# Patient Record
Sex: FEMALE | Race: ASIAN | NOT HISPANIC OR LATINO | Employment: STUDENT | URBAN - METROPOLITAN AREA
[De-identification: names, ages, dates, MRNs, and addresses within clinical notes are randomized per-mention and may not be internally consistent; named-entity substitution may affect disease eponyms.]

---
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
## 2020-04-08 ENCOUNTER — APPOINTMENT (OUTPATIENT)
Dept: PEDIATRIC HEMATOLOGY/ONCOLOGY | Facility: OUTPATIENT CENTER | Age: 20
End: 2020-04-08
Payer: COMMERCIAL

## 2020-04-30 ENCOUNTER — HOSPITAL ENCOUNTER (OUTPATIENT)
Dept: LAB | Facility: MEDICAL CENTER | Age: 20
End: 2020-04-30
Attending: FAMILY MEDICINE
Payer: COMMERCIAL

## 2020-04-30 LAB
ALBUMIN SERPL BCP-MCNC: 4.5 G/DL (ref 3.2–4.9)
ALBUMIN/GLOB SERPL: 1.7 G/DL
ALP SERPL-CCNC: 56 U/L (ref 30–99)
ALT SERPL-CCNC: 10 U/L (ref 2–50)
ANION GAP SERPL CALC-SCNC: 13 MMOL/L (ref 7–16)
AST SERPL-CCNC: 14 U/L (ref 12–45)
BASOPHILS # BLD AUTO: 0.4 % (ref 0–1.8)
BASOPHILS # BLD: 0.02 K/UL (ref 0–0.12)
BILIRUB SERPL-MCNC: 0.6 MG/DL (ref 0.1–1.5)
BUN SERPL-MCNC: 12 MG/DL (ref 8–22)
CALCIUM SERPL-MCNC: 9.2 MG/DL (ref 8.5–10.5)
CHLORIDE SERPL-SCNC: 103 MMOL/L (ref 96–112)
CO2 SERPL-SCNC: 24 MMOL/L (ref 20–33)
CREAT SERPL-MCNC: 0.63 MG/DL (ref 0.5–1.4)
CRP SERPL HS-MCNC: 0.04 MG/DL (ref 0–0.75)
EOSINOPHIL # BLD AUTO: 0.15 K/UL (ref 0–0.51)
EOSINOPHIL NFR BLD: 2.7 % (ref 0–6.9)
ERYTHROCYTE [DISTWIDTH] IN BLOOD BY AUTOMATED COUNT: 40.8 FL (ref 35.9–50)
ERYTHROCYTE [SEDIMENTATION RATE] IN BLOOD BY WESTERGREN METHOD: 2 MM/HOUR (ref 0–20)
FASTING STATUS PATIENT QL REPORTED: NORMAL
GLOBULIN SER CALC-MCNC: 2.6 G/DL (ref 1.9–3.5)
GLUCOSE SERPL-MCNC: 87 MG/DL (ref 65–99)
HCT VFR BLD AUTO: 44.5 % (ref 37–47)
HGB BLD-MCNC: 14.9 G/DL (ref 12–16)
IMM GRANULOCYTES # BLD AUTO: 0.01 K/UL (ref 0–0.11)
IMM GRANULOCYTES NFR BLD AUTO: 0.2 % (ref 0–0.9)
LYMPHOCYTES # BLD AUTO: 1.46 K/UL (ref 1–4.8)
LYMPHOCYTES NFR BLD: 26.7 % (ref 22–41)
MCH RBC QN AUTO: 32.9 PG (ref 27–33)
MCHC RBC AUTO-ENTMCNC: 33.5 G/DL (ref 33.6–35)
MCV RBC AUTO: 98.2 FL (ref 81.4–97.8)
MONOCYTES # BLD AUTO: 0.29 K/UL (ref 0–0.85)
MONOCYTES NFR BLD AUTO: 5.3 % (ref 0–13.4)
NEUTROPHILS # BLD AUTO: 3.53 K/UL (ref 2–7.15)
NEUTROPHILS NFR BLD: 64.7 % (ref 44–72)
NRBC # BLD AUTO: 0 K/UL
NRBC BLD-RTO: 0 /100 WBC
PLATELET # BLD AUTO: 212 K/UL (ref 164–446)
PMV BLD AUTO: 9.4 FL (ref 9–12.9)
POTASSIUM SERPL-SCNC: 4.1 MMOL/L (ref 3.6–5.5)
PROT SERPL-MCNC: 7.1 G/DL (ref 6–8.2)
RBC # BLD AUTO: 4.53 M/UL (ref 4.2–5.4)
RHEUMATOID FACT SER IA-ACNC: <10 IU/ML (ref 0–14)
SODIUM SERPL-SCNC: 140 MMOL/L (ref 135–145)
URATE SERPL-MCNC: 3.5 MG/DL (ref 1.9–8.2)
WBC # BLD AUTO: 5.5 K/UL (ref 4.8–10.8)

## 2020-04-30 PROCEDURE — 85652 RBC SED RATE AUTOMATED: CPT

## 2020-04-30 PROCEDURE — 36415 COLL VENOUS BLD VENIPUNCTURE: CPT

## 2020-04-30 PROCEDURE — 86140 C-REACTIVE PROTEIN: CPT

## 2020-04-30 PROCEDURE — 85025 COMPLETE CBC W/AUTO DIFF WBC: CPT

## 2020-04-30 PROCEDURE — 80053 COMPREHEN METABOLIC PANEL: CPT

## 2020-04-30 PROCEDURE — 84550 ASSAY OF BLOOD/URIC ACID: CPT

## 2020-04-30 PROCEDURE — 86200 CCP ANTIBODY: CPT

## 2020-04-30 PROCEDURE — 86431 RHEUMATOID FACTOR QUANT: CPT

## 2020-05-01 LAB — CCP IGG SERPL-ACNC: 11 UNITS (ref 0–19)

## 2020-05-11 ENCOUNTER — OFFICE VISIT (OUTPATIENT)
Dept: PEDIATRIC HEMATOLOGY/ONCOLOGY | Facility: OUTPATIENT CENTER | Age: 20
End: 2020-05-11
Payer: COMMERCIAL

## 2020-05-11 VITALS
BODY MASS INDEX: 26.11 KG/M2 | TEMPERATURE: 99.3 F | HEIGHT: 66 IN | DIASTOLIC BLOOD PRESSURE: 81 MMHG | WEIGHT: 162.48 LBS | SYSTOLIC BLOOD PRESSURE: 120 MMHG | OXYGEN SATURATION: 97 % | HEART RATE: 87 BPM

## 2020-05-11 DIAGNOSIS — Z85.6 HISTORY OF LEUKEMIA: ICD-10-CM

## 2020-05-11 PROCEDURE — 99213 OFFICE O/P EST LOW 20 MIN: CPT | Performed by: PEDIATRICS

## 2020-05-11 ASSESSMENT — FIBROSIS 4 INDEX: FIB4 SCORE: 0.42

## 2020-05-11 NOTE — PROGRESS NOTES
Pediatric Hematology/Oncology Clinic  Progress Note      Patient Name:  Emy Francis  : 2000   MRN: [id number]    Location of Service: [facility]    Date of Service: 2020  Time: 2:30 PM    Primary Care Physician: Cadence Morales M.D.    HISTORY OF PRESENT ILLNESS:     Chief Complaint: ON STUDY Follow-up [id number](OS), 45 months off therapy    History of Present Illness: Emy Francis is a 20 y.o. female who returns to the [facility] for follow-up of her history of Acute Promyelocytic Anemia now 45 months off therapy.  Emy presents to clinic by herself.  She provides accurate clinical history.    Review of Systems:     Constitutional: Afebrile.  Without recent illness.  Energy and activity are good.   HENT: Negative.  Eyes: Negative for visual changes.  Has not recently been to the ophthalmologist.  Respiratory: Negative for shortness of breath.  Cardiovascular: Negative.  Gastrointestinal: Negative for nausea, vomiting, abdominal pain, diarrhea, constipation.  Genitourinary: Negative.  Periods well controlled on IUD with minimal breakthrough bleeding.  Still needs to be seen by OB/GYN for ovarian cyst removal.  Musculoskeletal: Does complain of worsening knee and hip pain with a snapping sensation in her hips.  Skin: Negative for rash, signs of infection.  No concerning skin changes.  Neurological: Negative for numbness, tingling, sensory changes, weakness or headaches.    Endo/Heme/Allergies: Does not bruise/bleed easily.    Psychiatric/Behavioral: No changes in mood, appropriate for age.     PAST MEDICAL HISTORY:     HISTORY REVIEWED AND UPDATED FROM 2019    Past Medical History:     1) Previously health, only one episode of otitis media as a child  2) Acute Promyelocytic Leukemia (APML) diagnosis  3) No hospitalizations or surgeries prior to diagnosis of APML  4) Seasonal allergies  5)  "Obesity (RESOLVED)  6) Ovarian Cyst       Past Surgical History:      1) Therapy related bone marrow aspiration, lumbar punctures  2) Port-a-cath placement and removal     Birth/Developmental History:     1st of 2 children  No complications of pregnancy, good prenatal care  Delivered at 39 weeks EGA, no complications of delivery, however transferred to the NICU for temperature of 103F - stayed for 10 days  No further complications  Never any concerns for growth and development  Has always met milestones  Excels in school      Menstrual History:  Breakthrough bleeding following IUD placement-persistent     Allergies:         Allergies as of 08/15/2018   • (No Known Allergies)      Social History:   Lives at home with mother, father and younger brother.  All are healthy and well. 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     Family History:      1) Maternal grandfather with Stage IV gastric Ca.  2) Paternal grandfather with \"pre-kidney cancer\"  3) Cousin with lupus  4) Family history of HTN  5) No childhood cancers, no childhood unexpected deaths or illness, no rheumatologicc disease, bleeding or clotting disorders.     Immunizations:  Up to date.    Medications:   Current Outpatient Medications on File Prior to Visit   Medication Sig Dispense Refill   • diphenhydrAMINE (BENADRYL) 50 MG tablet 50 mg.     • Probiotic Product (PROBIOTIC-10) Cap Take  by mouth.     • cetirizine (ZYRTEC) 10 MG Tab Take 10 mg by mouth every day.       No current facility-administered medications on file prior to visit.          OBJECTIVE:     Vitals:   /81 (BP Location: Left arm, Patient Position: Sitting, BP Cuff Size: Adult)   Pulse 87   Temp 37.4 °C (99.3 °F) (Temporal)   Ht 1.68 m (5' 6.14\")   Wt 73.7 kg (162 lb 7.7 oz)   SpO2 97%     Labs:    No visits with results within 2 Day(s) from this visit.   Latest known visit with results is:   Hospital Outpatient Visit on 04/30/2020   Component Date Value   • Fasting " Status 04/30/2020 Fasting    • WBC 04/30/2020 5.5    • RBC 04/30/2020 4.53    • Hemoglobin 04/30/2020 14.9    • Hematocrit 04/30/2020 44.5    • MCV 04/30/2020 98.2*   • MCH 04/30/2020 32.9    • MCHC 04/30/2020 33.5*   • RDW 04/30/2020 40.8    • Platelet Count 04/30/2020 212    • MPV 04/30/2020 9.4    • Neutrophils-Polys 04/30/2020 64.70    • Lymphocytes 04/30/2020 26.70    • Monocytes 04/30/2020 5.30    • Eosinophils 04/30/2020 2.70    • Basophils 04/30/2020 0.40    • Immature Granulocytes 04/30/2020 0.20    • Nucleated RBC 04/30/2020 0.00    • Neutrophils (Absolute) 04/30/2020 3.53    • Lymphs (Absolute) 04/30/2020 1.46    • Monos (Absolute) 04/30/2020 0.29    • Eos (Absolute) 04/30/2020 0.15    • Baso (Absolute) 04/30/2020 0.02    • Immature Granulocytes (a* 04/30/2020 0.01    • NRBC (Absolute) 04/30/2020 0.00    • Sed Rate Westergren 04/30/2020 2    • Rheumatoid Factor -Neph- 04/30/2020 <10    • GFR If  04/30/2020 >60    • GFR If Non  Ameri* 04/30/2020 >60    • Sodium 04/30/2020 140    • Potassium 04/30/2020 4.1    • Chloride 04/30/2020 103    • Co2 04/30/2020 24    • Anion Gap 04/30/2020 13.0    • Glucose 04/30/2020 87    • Bun 04/30/2020 12    • Creatinine 04/30/2020 0.63    • Calcium 04/30/2020 9.2    • AST(SGOT) 04/30/2020 14    • ALT(SGPT) 04/30/2020 10    • Alkaline Phosphatase 04/30/2020 56    • Total Bilirubin 04/30/2020 0.6    • Albumin 04/30/2020 4.5    • Total Protein 04/30/2020 7.1    • Globulin 04/30/2020 2.6    • A-G Ratio 04/30/2020 1.7    • Stat C-Reactive Protein 04/30/2020 0.04    • Uric Acid 04/30/2020 3.5    • Ccp Antibodies 04/30/2020 11      Physical Exam:    Constitutional: Well-developed, well-nourished, and in no distress.  Very well-appearing.  HENT: Normocephalic and atraumatic. No oropharyngeal examination given COVID 19 precautions.  Eyes: Conjunctivae are normal. Pupils are equal, round.  Nonicteric.  EOMI.  Neck: Normal range of motion of neck, no adenopathy.     Cardiovascular: Normal rate, regular rhythm and normal heart sounds.  No murmur heard. DP/radial pulses 2+, cap refill < 2 sec.  Pulmonary/Chest: Effort normal and breath sounds normal. No respiratory distress. Symmetric expansion.  No crackles or wheezes.  Abdomen: Soft. Bowel sounds are normal. No distension and no mass. There is no hepatosplenomegaly.    Genitourinary:  Deferred.  Musculoskeletal: Normal range of motion of lower and upper extremities bilaterally. No tenderness to palpation of elbows, wrists, hands, knees, ankles and feet bilaterally.   Lymphadenopathy: No cervical adenopathy, axillary adenopathy or inguinal adenopathy.   Neurological: Alert and oriented to person and place. Exhibits normal muscle tone bilaterally in upper and lower extremities. Gait normal. Coordination normal.    Skin: Skin is warm, dry and pink.  No rash or evidence of skin infection.  No pallor.   Psychiatric: Mood and affect normal for age.    ASSESSMENT AND PLAN:     Emy Francis is a 20 year old previously healthy female with a history of APML, treated on [id number], now 45 months off therapy for follow-up     1) Acute Promyelocytic Leukemia, History:              - Completed therapy ON STUDY [id number], now 45 months off therapy              - No clinical evidence of disease, no laboratory evidence of disease              - WBC 5.5, Hgb 14.9, platelets 212 normal and reassuring (elevated MCV see below)              - No physical evidence of disease              - Will RTC 6 months then space to yearly visits - continue data submission for study              - RTC 6 months for 51 months off therapy evaluation and labs     2) Headaches (RESOLVED):         3) Hypertension (RESOLVED):              - History of hypertension in the past              - Today 120/81    4) History of Rapid Weight Loss:              - Previously obese at end of therapy   - Had symptoms of joint pains, fatigue, headaches etc   - Has lost  significant weight in past 1.5 years after implementing lifestyle modifications   - At last visit, concerns for rapid weight loss and elevated MCV   - Today, back up 4.9 kg from last visit   - MCV improved   - Continue to discuss healthy weight    5) Joint Pains (WORSENING OVER PAST COUPLE MONTHS):    - Complains of worsening knee pain and now with a snapping/clicking sensation in hips   - Remains active and has started weight lifting.  Does not associate new pains with weight lifting.   - Reports adequate stretching prior to activity   - Does not have improvement in discomfort and pain when resting   - Has reported complaints to primary care physician and has been worked up for gout as well as rheumatoid arthritis all of which have come back normal   - Discussed symptoms of snapping hip syndrome - and Emy ascribes to the diagnosis   - Discussed snapping hip as an overuse injury which her knee pain is also likely attributed to   - Encourage proper stretching and rest   - Possible sports medicine consultation     6) History of Vitamin D Deficiency:              - Continue 25-OH vitamin D      7) Survivorship/Late Effects Monitoring:              - Cognitive:  No cognitive concerns at this time.  Doing well in school despite distance learning.                - Psychosocial:  No reports of behavioral issues.              - Renal:  Blood pressure 120/81 today. Creatinine 0.63 (return to baseline).  Electrolytes normal.              - Cardiac:  ECHO yearly to 10 years.  Will need ECHO again this year.  Orders placed..              - Pulmonary:  No chest/mediastinal radiation.  Clinical exam unremarkable.              - Musckuloskeletal:  High dose steroid exposure during therapy.  History of low vitamin D levels, continue on Vitamin D - Calcium supplementation with daily chews.               - Growth and Development: BMI increased slightly to 26 kg/m2 - considered just overweight.              - Reproductive:  IUD  in place.  Breakthrough bleeding unchanged.              - Performance:  Karnofsky 100 / ECOG 1     Follow up for [facility] [id number]:       End of Therapy Follow-up Relapse   Physical Exam with VS X Monthly to 12 months  Q3 months to 36 months  Q6 months to 48 months  Yearly to 10 years X   Ht, Wt, BSA                          X   Performance Status X   X   CBC, Diff, Plts X Monthly to 12 months  Q3 months to 36 months  Q6 months to 48 months  Yearly to 10 years X   Electrolytes (Ca++, Mg++, K+) and Creatinine     X   Uric Acid     X   ECHO   X Yearly to 10 years X   BMA X   X         Disposition:  Return to clinic in 6 month for 51 month off therapy evaluation, PE, and labs (then space to yearly)     Heriberto Hylton MD  Pediatric Hematology / Oncology  [facility]  Cell.  [phone]  Office. [phone]

## 2020-05-13 ENCOUNTER — TELEPHONE (OUTPATIENT)
Dept: PEDIATRIC HEMATOLOGY/ONCOLOGY | Facility: OUTPATIENT CENTER | Age: 20
End: 2020-05-13

## 2020-05-13 NOTE — TELEPHONE ENCOUNTER
Called Emy to let her know that we scheduled an ECHO for her on July 6th at 1 pm at the [facility] [place]. Left her the number to the [facility] Scheduling Line if she needed to change or modify the appointment as well as left her my number to call if she has questions or concerns.

## 2020-07-06 ENCOUNTER — HOSPITAL ENCOUNTER (OUTPATIENT)
Dept: CARDIOLOGY | Facility: MEDICAL CENTER | Age: 20
End: 2020-07-06
Attending: PEDIATRICS
Payer: COMMERCIAL

## 2020-07-06 DIAGNOSIS — Z85.6 HISTORY OF LEUKEMIA: ICD-10-CM

## 2020-07-06 LAB
LV EJECT FRACT  99904: 70
LV EJECT FRACT MOD 2C 99903: 69.02
LV EJECT FRACT MOD 4C 99902: 72.83
LV EJECT FRACT MOD BP 99901: 71.44

## 2020-07-06 PROCEDURE — 93306 TTE W/DOPPLER COMPLETE: CPT

## 2020-07-06 PROCEDURE — 93306 TTE W/DOPPLER COMPLETE: CPT | Mod: 26 | Performed by: INTERNAL MEDICINE

## 2020-07-10 ENCOUNTER — OFFICE VISIT (OUTPATIENT)
Dept: ADMISSIONS | Facility: MEDICAL CENTER | Age: 20
End: 2020-07-10
Attending: OBSTETRICS & GYNECOLOGY
Payer: COMMERCIAL

## 2020-07-10 DIAGNOSIS — Z01.812 PRE-OPERATIVE LABORATORY EXAMINATION: ICD-10-CM

## 2020-07-10 LAB — COVID ORDER STATUS COVID19: NORMAL

## 2020-07-10 PROCEDURE — U0003 INFECTIOUS AGENT DETECTION BY NUCLEIC ACID (DNA OR RNA); SEVERE ACUTE RESPIRATORY SYNDROME CORONAVIRUS 2 (SARS-COV-2) (CORONAVIRUS DISEASE [COVID-19]), AMPLIFIED PROBE TECHNIQUE, MAKING USE OF HIGH THROUGHPUT TECHNOLOGIES AS DESCRIBED BY CMS-2020-01-R: HCPCS

## 2020-07-10 RX ORDER — DIPHENHYDRAMINE HCL 25 MG
25 TABLET ORAL NIGHTLY PRN
COMMUNITY
End: 2021-09-28

## 2020-07-10 SDOH — HEALTH STABILITY: MENTAL HEALTH: HOW OFTEN DO YOU HAVE A DRINK CONTAINING ALCOHOL?: NEVER

## 2020-07-11 LAB
SARS-COV-2 RNA RESP QL NAA+PROBE: NOTDETECTED
SPECIMEN SOURCE: NORMAL

## 2020-07-14 NOTE — H&P
DATE OF ADMISSION:  07/15/2020    She is admitted for surgery as an outpatient on 07/15.    CHIEF COMPLAINT:  Here for removal of ovarian cyst.    HISTORY OF PRESENT ILLNESS:  The patient is a 20-year-old nulligravida female   who had an ultrasound done approximately 6 months ago and showed the presence   of an 8x5 cm ovarian cyst during placement of an IUD.  This cyst appears to be   consistent with a dermoid cyst on the ovary.  She is currently asymptomatic.    It appears to be present in the left adnexa.  Because of the persistence of   this abnormal cyst, it is recommended removal be undertaken.    PAST MEDICAL HISTORY:  The patient had childhood leukemia.  She has been   otherwise healthy and on no medications and no other medical illnesses.    PAST SURGICAL HISTORY:  Surgeries have only included a bone marrow biopsy and   a port catheter placement.    ALLERGIES:  No known drug allergies.    FAMILY HISTORY:  Mother has history of hypertension.    SOCIAL HISTORY:  She is single.  She is a nonsmoker.  Denies alcohol use and   is a student.    PHYSICAL EXAMINATION:  VITAL SIGNS:  Her weight is 172.  Her blood pressure 130/80.  GENERAL APPEARANCE:  She is slightly overweight, otherwise healthy-appearing   in no distress.  LUNGS:  Clear to auscultation.  HEART:  Regular rate and rhythm.  BREASTS:  Not examined.  ABDOMEN:  Soft and nontender.  GYNECOLOGIC EXAM:  The uterus is small, mobile, nontender.  There is fullness   present in the left adnexa.  There is no tenderness in the pelvis.    IMPRESSION:  Dermoid cyst, left ovary.    PLAN:  The patient will undergo laparoscopy.  The plan will be to try to   remove the cyst and conserve ovary as much as possible; however, she   understands that it may require removing the entire ovary if no normal ovarian   stroma can be seen.  She had been counseled regarding the risks and   complications of the surgery, wants to proceed with the surgery as scheduled.        ____________________________________     MD JOSE PHILLIPS / MARIAA    DD:  07/14/2020 12:58:28  DT:  07/14/2020 15:27:13    D#:  [id number]  Job#:  [id number]

## 2020-07-15 ENCOUNTER — ANESTHESIA EVENT (OUTPATIENT)
Dept: SURGERY | Facility: MEDICAL CENTER | Age: 20
End: 2020-07-15
Payer: COMMERCIAL

## 2020-07-15 ENCOUNTER — ANESTHESIA (OUTPATIENT)
Dept: SURGERY | Facility: MEDICAL CENTER | Age: 20
End: 2020-07-15
Payer: COMMERCIAL

## 2020-07-15 ENCOUNTER — HOSPITAL ENCOUNTER (OUTPATIENT)
Facility: MEDICAL CENTER | Age: 20
End: 2020-07-15
Attending: OBSTETRICS & GYNECOLOGY | Admitting: OBSTETRICS & GYNECOLOGY
Payer: COMMERCIAL

## 2020-07-15 VITALS
RESPIRATION RATE: 16 BRPM | BODY MASS INDEX: 26.99 KG/M2 | OXYGEN SATURATION: 94 % | DIASTOLIC BLOOD PRESSURE: 66 MMHG | WEIGHT: 171.96 LBS | HEART RATE: 87 BPM | SYSTOLIC BLOOD PRESSURE: 107 MMHG | TEMPERATURE: 98.7 F | HEIGHT: 67 IN

## 2020-07-15 LAB — HCG UR QL: NEGATIVE

## 2020-07-15 PROCEDURE — 160041 HCHG SURGERY MINUTES - EA ADDL 1 MIN LEVEL 4: Performed by: OBSTETRICS & GYNECOLOGY

## 2020-07-15 PROCEDURE — 160036 HCHG PACU - EA ADDL 30 MINS PHASE I: Performed by: OBSTETRICS & GYNECOLOGY

## 2020-07-15 PROCEDURE — 501838 HCHG SUTURE GENERAL: Performed by: OBSTETRICS & GYNECOLOGY

## 2020-07-15 PROCEDURE — 160046 HCHG PACU - 1ST 60 MINS PHASE II: Performed by: OBSTETRICS & GYNECOLOGY

## 2020-07-15 PROCEDURE — A9270 NON-COVERED ITEM OR SERVICE: HCPCS | Performed by: ANESTHESIOLOGY

## 2020-07-15 PROCEDURE — 81025 URINE PREGNANCY TEST: CPT

## 2020-07-15 PROCEDURE — 160035 HCHG PACU - 1ST 60 MINS PHASE I: Performed by: OBSTETRICS & GYNECOLOGY

## 2020-07-15 PROCEDURE — 501586 HCHG TROCAR, THRD SPIKE 5X55: Performed by: OBSTETRICS & GYNECOLOGY

## 2020-07-15 PROCEDURE — 700101 HCHG RX REV CODE 250: Performed by: ANESTHESIOLOGY

## 2020-07-15 PROCEDURE — 501584 HCHG TROCAR, THRD CAN&SEAL11X100: Performed by: OBSTETRICS & GYNECOLOGY

## 2020-07-15 PROCEDURE — 501399 HCHG SPECIMAN BAG, ENDO CATC: Performed by: OBSTETRICS & GYNECOLOGY

## 2020-07-15 PROCEDURE — 160048 HCHG OR STATISTICAL LEVEL 1-5: Performed by: OBSTETRICS & GYNECOLOGY

## 2020-07-15 PROCEDURE — A9270 NON-COVERED ITEM OR SERVICE: HCPCS

## 2020-07-15 PROCEDURE — 160009 HCHG ANES TIME/MIN: Performed by: OBSTETRICS & GYNECOLOGY

## 2020-07-15 PROCEDURE — 500886 HCHG PACK, LAPAROSCOPY: Performed by: OBSTETRICS & GYNECOLOGY

## 2020-07-15 PROCEDURE — 700102 HCHG RX REV CODE 250 W/ 637 OVERRIDE(OP)

## 2020-07-15 PROCEDURE — 700105 HCHG RX REV CODE 258: Performed by: OBSTETRICS & GYNECOLOGY

## 2020-07-15 PROCEDURE — 700102 HCHG RX REV CODE 250 W/ 637 OVERRIDE(OP): Performed by: ANESTHESIOLOGY

## 2020-07-15 PROCEDURE — 501582 HCHG TROCAR, THRD BLADED: Performed by: OBSTETRICS & GYNECOLOGY

## 2020-07-15 PROCEDURE — 700101 HCHG RX REV CODE 250: Performed by: OBSTETRICS & GYNECOLOGY

## 2020-07-15 PROCEDURE — 160002 HCHG RECOVERY MINUTES (STAT): Performed by: OBSTETRICS & GYNECOLOGY

## 2020-07-15 PROCEDURE — 700111 HCHG RX REV CODE 636 W/ 250 OVERRIDE (IP): Performed by: ANESTHESIOLOGY

## 2020-07-15 PROCEDURE — 500868 HCHG NEEDLE, SURGI(VARES): Performed by: OBSTETRICS & GYNECOLOGY

## 2020-07-15 PROCEDURE — 88307 TISSUE EXAM BY PATHOLOGIST: CPT

## 2020-07-15 PROCEDURE — 160025 RECOVERY II MINUTES (STATS): Performed by: OBSTETRICS & GYNECOLOGY

## 2020-07-15 PROCEDURE — 160029 HCHG SURGERY MINUTES - 1ST 30 MINS LEVEL 4: Performed by: OBSTETRICS & GYNECOLOGY

## 2020-07-15 RX ORDER — MEPERIDINE HYDROCHLORIDE 25 MG/ML
6.25 INJECTION INTRAMUSCULAR; INTRAVENOUS; SUBCUTANEOUS
Status: DISCONTINUED | OUTPATIENT
Start: 2020-07-15 | End: 2020-07-15 | Stop reason: HOSPADM

## 2020-07-15 RX ORDER — HALOPERIDOL 5 MG/ML
1 INJECTION INTRAMUSCULAR
Status: DISCONTINUED | OUTPATIENT
Start: 2020-07-15 | End: 2020-07-15 | Stop reason: HOSPADM

## 2020-07-15 RX ORDER — ONDANSETRON 2 MG/ML
4 INJECTION INTRAMUSCULAR; INTRAVENOUS
Status: DISCONTINUED | OUTPATIENT
Start: 2020-07-15 | End: 2020-07-15 | Stop reason: HOSPADM

## 2020-07-15 RX ORDER — ONDANSETRON 2 MG/ML
4 INJECTION INTRAMUSCULAR; INTRAVENOUS EVERY 6 HOURS PRN
Status: DISCONTINUED | OUTPATIENT
Start: 2020-07-15 | End: 2020-07-15 | Stop reason: HOSPADM

## 2020-07-15 RX ORDER — BUPIVACAINE HYDROCHLORIDE AND EPINEPHRINE 2.5; 5 MG/ML; UG/ML
INJECTION, SOLUTION EPIDURAL; INFILTRATION; INTRACAUDAL; PERINEURAL
Status: DISCONTINUED
Start: 2020-07-15 | End: 2020-07-15 | Stop reason: HOSPADM

## 2020-07-15 RX ORDER — SODIUM CHLORIDE, SODIUM LACTATE, POTASSIUM CHLORIDE, CALCIUM CHLORIDE 600; 310; 30; 20 MG/100ML; MG/100ML; MG/100ML; MG/100ML
INJECTION, SOLUTION INTRAVENOUS CONTINUOUS
Status: DISCONTINUED | OUTPATIENT
Start: 2020-07-15 | End: 2020-07-15 | Stop reason: HOSPADM

## 2020-07-15 RX ORDER — DIPHENHYDRAMINE HYDROCHLORIDE 50 MG/ML
12.5 INJECTION INTRAMUSCULAR; INTRAVENOUS
Status: DISCONTINUED | OUTPATIENT
Start: 2020-07-15 | End: 2020-07-15 | Stop reason: HOSPADM

## 2020-07-15 RX ORDER — ONDANSETRON 2 MG/ML
INJECTION INTRAMUSCULAR; INTRAVENOUS PRN
Status: DISCONTINUED | OUTPATIENT
Start: 2020-07-15 | End: 2020-07-15 | Stop reason: SURG

## 2020-07-15 RX ORDER — KETOROLAC TROMETHAMINE 30 MG/ML
INJECTION, SOLUTION INTRAMUSCULAR; INTRAVENOUS PRN
Status: DISCONTINUED | OUTPATIENT
Start: 2020-07-15 | End: 2020-07-15 | Stop reason: SURG

## 2020-07-15 RX ORDER — OXYCODONE HCL 5 MG/5 ML
5 SOLUTION, ORAL ORAL
Status: COMPLETED | OUTPATIENT
Start: 2020-07-15 | End: 2020-07-15

## 2020-07-15 RX ORDER — LIDOCAINE HYDROCHLORIDE 20 MG/ML
INJECTION, SOLUTION EPIDURAL; INFILTRATION; INTRACAUDAL; PERINEURAL PRN
Status: DISCONTINUED | OUTPATIENT
Start: 2020-07-15 | End: 2020-07-15 | Stop reason: SURG

## 2020-07-15 RX ORDER — MIDAZOLAM HYDROCHLORIDE 1 MG/ML
INJECTION INTRAMUSCULAR; INTRAVENOUS PRN
Status: DISCONTINUED | OUTPATIENT
Start: 2020-07-15 | End: 2020-07-15 | Stop reason: SURG

## 2020-07-15 RX ORDER — OXYCODONE HCL 5 MG/5 ML
10 SOLUTION, ORAL ORAL
Status: COMPLETED | OUTPATIENT
Start: 2020-07-15 | End: 2020-07-15

## 2020-07-15 RX ORDER — BUPIVACAINE HYDROCHLORIDE AND EPINEPHRINE 2.5; 5 MG/ML; UG/ML
INJECTION, SOLUTION EPIDURAL; INFILTRATION; INTRACAUDAL; PERINEURAL
Status: DISCONTINUED | OUTPATIENT
Start: 2020-07-15 | End: 2020-07-15 | Stop reason: HOSPADM

## 2020-07-15 RX ORDER — DEXAMETHASONE SODIUM PHOSPHATE 4 MG/ML
INJECTION, SOLUTION INTRA-ARTICULAR; INTRALESIONAL; INTRAMUSCULAR; INTRAVENOUS; SOFT TISSUE PRN
Status: DISCONTINUED | OUTPATIENT
Start: 2020-07-15 | End: 2020-07-15 | Stop reason: SURG

## 2020-07-15 RX ORDER — IBUPROFEN 400 MG/1
400 TABLET ORAL EVERY 6 HOURS PRN
Status: DISCONTINUED | OUTPATIENT
Start: 2020-07-15 | End: 2020-07-15 | Stop reason: HOSPADM

## 2020-07-15 RX ORDER — ROCURONIUM BROMIDE 10 MG/ML
INJECTION, SOLUTION INTRAVENOUS PRN
Status: DISCONTINUED | OUTPATIENT
Start: 2020-07-15 | End: 2020-07-15 | Stop reason: SURG

## 2020-07-15 RX ORDER — ACETAMINOPHEN 500 MG
1000 TABLET ORAL ONCE
Status: DISCONTINUED | OUTPATIENT
Start: 2020-07-15 | End: 2020-07-15 | Stop reason: HOSPADM

## 2020-07-15 RX ADMIN — SODIUM CHLORIDE, POTASSIUM CHLORIDE, SODIUM LACTATE AND CALCIUM CHLORIDE: 600; 310; 30; 20 INJECTION, SOLUTION INTRAVENOUS at 15:34

## 2020-07-15 RX ADMIN — SUGAMMADEX 200 MG: 100 INJECTION, SOLUTION INTRAVENOUS at 17:37

## 2020-07-15 RX ADMIN — SODIUM CHLORIDE, POTASSIUM CHLORIDE, SODIUM LACTATE AND CALCIUM CHLORIDE: 600; 310; 30; 20 INJECTION, SOLUTION INTRAVENOUS at 14:19

## 2020-07-15 RX ADMIN — ONDANSETRON 4 MG: 2 INJECTION INTRAMUSCULAR; INTRAVENOUS at 15:58

## 2020-07-15 RX ADMIN — ROCURONIUM BROMIDE 50 MG: 10 INJECTION, SOLUTION INTRAVENOUS at 15:38

## 2020-07-15 RX ADMIN — OXYCODONE HYDROCHLORIDE 10 MG: 5 SOLUTION ORAL at 18:05

## 2020-07-15 RX ADMIN — FENTANYL CITRATE 50 MCG: 50 INJECTION INTRAMUSCULAR; INTRAVENOUS at 18:13

## 2020-07-15 RX ADMIN — FENTANYL CITRATE 50 MCG: 50 INJECTION INTRAMUSCULAR; INTRAVENOUS at 18:28

## 2020-07-15 RX ADMIN — KETOROLAC TROMETHAMINE 30 MG: 30 INJECTION, SOLUTION INTRAMUSCULAR at 15:53

## 2020-07-15 RX ADMIN — DEXAMETHASONE SODIUM PHOSPHATE 8 MG: 4 INJECTION, SOLUTION INTRA-ARTICULAR; INTRALESIONAL; INTRAMUSCULAR; INTRAVENOUS; SOFT TISSUE at 15:46

## 2020-07-15 RX ADMIN — LIDOCAINE HYDROCHLORIDE 60 MG: 20 INJECTION, SOLUTION EPIDURAL; INFILTRATION; INTRACAUDAL at 15:38

## 2020-07-15 RX ADMIN — MIDAZOLAM HYDROCHLORIDE 1 MG: 1 INJECTION, SOLUTION INTRAMUSCULAR; INTRAVENOUS at 15:37

## 2020-07-15 RX ADMIN — FENTANYL CITRATE 50 MCG: 50 INJECTION INTRAMUSCULAR; INTRAVENOUS at 16:10

## 2020-07-15 RX ADMIN — FENTANYL CITRATE 50 MCG: 50 INJECTION INTRAMUSCULAR; INTRAVENOUS at 15:38

## 2020-07-15 RX ADMIN — FENTANYL CITRATE 50 MCG: 50 INJECTION INTRAMUSCULAR; INTRAVENOUS at 16:45

## 2020-07-15 RX ADMIN — PROPOFOL 200 MG: 10 INJECTION, EMULSION INTRAVENOUS at 15:38

## 2020-07-15 RX ADMIN — FENTANYL CITRATE 50 MCG: 50 INJECTION INTRAMUSCULAR; INTRAVENOUS at 17:31

## 2020-07-15 RX ADMIN — POVIDONE-IODINE 15 ML: 10 SOLUTION TOPICAL at 14:20

## 2020-07-15 ASSESSMENT — PAIN SCALES - GENERAL: PAIN_LEVEL: 5

## 2020-07-15 ASSESSMENT — FIBROSIS 4 INDEX: FIB4 SCORE: 0.36

## 2020-07-15 NOTE — ANESTHESIA PROCEDURE NOTES
Airway    Date/Time: 7/15/2020 3:42 PM  Performed by: Diana Oreilly M.D.  Authorized by: Diana Oreilly M.D.     Location:  OR  Urgency:  Elective  Difficult Airway: No    Indications for Airway Management:  Anesthesia      Spontaneous Ventilation: absent    Sedation Level:  Deep  Preoxygenated: Yes    Patient Position:  Sniffing  MILS Maintained Throughout: No    Mask Difficulty Assessment:  1 - vent by mask  Final Airway Type:  Endotracheal airway  Final Endotracheal Airway:  ETT  Cuffed: Yes    Technique Used for Successful ETT Placement:  Direct laryngoscopy    Insertion Site:  Oral  Blade Type:  Phillips  Laryngoscope Blade/Videolaryngoscope Blade Size:  2  ETT Size (mm):  6.5  Placement Verified by: capnometry    Cormack-Lehane Classification:  Grade I - full view of glottis

## 2020-07-15 NOTE — ANESTHESIA PREPROCEDURE EVALUATION
Relevant Problems   NEURO   (+) History of leukemia       Physical Exam    Airway   Mallampati: I  TM distance: >3 FB       Cardiovascular   Rhythm: regular  Rate: normal     Dental - normal exam           Pulmonary    Abdominal - normal exam     Neurological              Anesthesia Plan    ASA 2       Plan - general       Airway plan will be ETT      Plan Factors:   Patient was not previously instructed to abstain from smoking on day of procedure.  Patient did not smoke on day of procedure.      Induction: intravenous    Postoperative Plan: Postoperative administration of opioids is intended.    Pertinent diagnostic labs and testing reviewed    Informed Consent:    Anesthetic plan and risks discussed with patient.    Use of blood products discussed with: patient whom.

## 2020-07-15 NOTE — ANESTHESIA TIME REPORT
Anesthesia Start and Stop Event Times     Date Time Event    7/15/2020 1531 Ready for Procedure     1534 Anesthesia Start        Responsible Staff  07/15/20    Name Role Begin End    Diana Oreilly M.D. Anesth 1534         Preop Diagnosis (Free Text):  Pre-op Diagnosis     DERMOID CYST        Preop Diagnosis (Codes):    Post op Diagnosis  Left ovarian cyst      Premium Reason  K. Alert    Comments:

## 2020-07-16 LAB — PATHOLOGY CONSULT NOTE: NORMAL

## 2020-07-16 NOTE — ANESTHESIA POSTPROCEDURE EVALUATION
Patient: Emy Francis    Procedure Summary     Date:  07/15/20 Room / Location:  [facility] ROOM 25 / SURGERY SAME DAY [facility]    Anesthesia Start:  1534 Anesthesia Stop:  1749    Procedures:       PELVISCOPY (Bilateral Pelvis)      EXCISION, CYST, OVARY (Left Pelvis) Diagnosis:  (DERMOID CYST)    Surgeon:  Nazario Dotson M.D. Responsible Provider:  Diana Oreilly M.D.    Anesthesia Type:  general ASA Status:  2          Final Anesthesia Type: general  Last vitals  BP   Blood Pressure: 104/58    Temp   37.1 °C (98.7 °F)    Pulse   Pulse: 94   Resp   16    SpO2   100 %      Anesthesia Post Evaluation    Patient location during evaluation: PACU  Patient participation: complete - patient participated  Level of consciousness: awake and alert  Pain score: 5    Airway patency: patent  Anesthetic complications: no  Cardiovascular status: hemodynamically stable  Respiratory status: acceptable and face mask  Hydration status: acceptable    PONV: none           Nurse Pain Score: 0 (NPRS)

## 2020-07-16 NOTE — OR SURGEON
preop ovarian cyst  Post op same  Operation laparoscopic ovarian cystectomy  Surgeon Klai  Anesthesia C Afia  EBL 25cc  Findings 8-10cm dermoid cyst left ovary

## 2020-07-16 NOTE — OP REPORT
DATE OF SERVICE:  07/15/2020    PREOPERATIVE DIAGNOSIS:  Dermoid cyst, left ovary.      POSTOPERATIVE DIAGNOSIS:  Dermoid cyst, left ovary.      OPERATIONS:    1.  Laparoscopy.    2.  Left ovarian cystectomy.      SURGEON:  Nazario Dotson MD     ANESTHESIA:  General.      ANESTHESIOLOGIST:  Diana Oreilly MD     ESTIMATED BLOOD LOSS:  Less than 25 mL.      OPERATIVE FINDINGS:  There was a large ovarian cyst on the left adnexa that   when dissected off and removed was consistent with a dermoid cyst.      DESCRIPTION OF PROCEDURE:  The patient was taken to the operating room, where   she was placed under general anesthesia in the lithotomy position and   sterilely prepped and draped.  A Veress needle was used to introduce CO2 in   the peritoneal cavity and an 11 mm disposable trocar inserted through the   umbilicus.  An operative laparoscope was placed to visualize the pelvic   contents.  A separate 10 mm port was placed suprapubically in the midline.  A   grasping tool was used to elevate the left ovarian cyst out of the pelvis on   top of the uterus.  It was uncertain whether I could salvage any left ovarian   tissue, but because of her youth and nulliparity, I began dissection of the   cyst off the base of the ovarian stroma by initially placing the LigaSure   bipolar device cauterizing the edge near the uteroovarian pedicle and then   dissecting this cyst from the overlying ovarian stroma.  I was able to carry   this dissection out tediously over time with the aid also of unipolar   scissors.  At one point, there was rupture of small portion of the cyst, which   was draining yellow thick fluid consistent with a dermoid.  Only   approximately 5-10% of the fluid leaked.  I was able to then salvage 10-20% of   ovarian stroma.  Once the cyst was removed from the remaining ovarian stroma,   an EndoCatch bag was placed through the lower port and the cyst put into the   EndoCatch bag and the EndoCatch bag was  closed and drawn up through the lower   incision.  I tried to drain as much of the liquidy material from the cyst to   decompress it.  However, there was still a large amount of tissue and hair   that required again tedious removal of pieces through the lower incision.  The   material was successfully contained in the bag until it was removed in its   entirety.  The trocar was placed back in the end of the lower port and the   pelvis was thoroughly irrigated until clear.  Left ovarian dissection was free   of any bleeding.  Pictures were taken for later patient review and education.    The instruments and carbon dioxide was released.  The incisions were closed   with 2-0 Vicryl in the deeper fascial layer and 4-0 Vicryl in the skin.  At   the end of operation, all counts were correct.  No complications were noted.    She was taken to recovery room in stable condition.       ____________________________________     MD JOSE PHILLIPS / MARIAA    DD:  07/15/2020 17:46:21  DT:  07/15/2020 21:39:45    D#:  [id number]  Job#:  [id number]

## 2020-07-16 NOTE — OR NURSING
1748- Pt arrives from OR and report received.  Incision covered with bandaid.  CDI.  Warmer placed on pt.    1805- Pt sts pain 5/10, able to tolerate sips of water, medicated with 10mg oxy po.    1813- Pt given 50mcg IV fent for pain 5/10.    1828- Pt given another 50mch IV fent for pain.    1835- Pt's parents in [facility], provided with d/c paperwork and instructions, no scripts.  All questions answered.    1900- Pt up to bathroom to void, voided without difficulty.  Pt sts she wants to lay down longer and not get dressed yet.    1915- Pt meets phase 2 criteria.    1928- Pt sts ready to try to change, pt changed without issue.    1935- IV removed, pt taken out via w/c.

## 2020-07-16 NOTE — DISCHARGE INSTRUCTIONS
ACTIVITY: Rest and take it easy for the first 24 hours.  A responsible adult is recommended to remain with you during that time.  It is normal to feel sleepy.  We encourage you to not do anything that requires balance, judgment or coordination.    MILD FLU-LIKE SYMPTOMS ARE NORMAL. YOU MAY EXPERIENCE GENERALIZED MUSCLE ACHES, THROAT IRRITATION, HEADACHE AND/OR SOME NAUSEA.    FOR 24 HOURS DO NOT:  Drive, operate machinery or run household appliances.  Drink beer or alcoholic beverages.   Make important decisions or sign legal documents.    SPECIAL INSTRUCTIONS: SEE ATTACHED SHEET.    DIET: To avoid nausea, slowly advance diet as tolerated, avoiding spicy or greasy foods for the first day.  Add more substantial food to your diet according to your physician's instructions.  Babies can be fed formula or breast milk as soon as they are hungry.  INCREASE FLUIDS AND FIBER TO AVOID CONSTIPATION.    SURGICAL DRESSING/BATHING: Okay to shower tomorrow.  No hot tubs, no tub baths, and no swimming until cleared by your doctor.    FOLLOW-UP APPOINTMENT:  A follow-up appointment should be arranged with your doctor; call to schedule.    You should CALL YOUR PHYSICIAN if you develop:  Fever greater than 101 degrees F.  Pain not relieved by medication, or persistent nausea or vomiting.  Excessive bleeding (blood soaking through dressing) or unexpected drainage from the wound.  Extreme redness or swelling around the incision site, drainage of pus or foul smelling drainage.  Inability to urinate or empty your bladder within 8 hours.  Problems with breathing or chest pain.    You should call 911 if you develop problems with breathing or chest pain.  If you are unable to contact your doctor or surgical center, you should go to the nearest emergency room or urgent care center.      Physician's telephone #: Dr Dotson [phone]    If any questions arise, call your doctor.  If your doctor is not available, please feel free to call the  Surgical Center at [phone].  The Center is open Monday through Friday from 7AM to 7PM.  You can also call the HEALTH HOTLINE open 24 hours/day, 7 days/week and speak to a nurse at [phone], or toll free at [phone].    A registered nurse may call you a few days after your surgery to see how you are doing after your procedure.    MEDICATIONS: Resume taking daily medication.  Take prescribed pain medication with food.  If no medication is prescribed, you may take non-aspirin pain medication if needed.  PAIN MEDICATION CAN BE VERY CONSTIPATING.  Take a stool softener or laxative such as senokot, pericolace, or milk of magnesia if needed.    Last pain medication given at 6:05pm.    If your physician has prescribed pain medication that includes Acetaminophen (Tylenol), do not take additional Acetaminophen (Tylenol) while taking the prescribed medication.    Depression / Suicide Risk    As you are discharged from this [facility] facility, it is important to learn how to keep safe from harming yourself.    Recognize the warning signs:  · Abrupt changes in personality, positive or negative- including increase in energy   · Giving away possessions  · Change in eating patterns- significant weight changes-  positive or negative  · Change in sleeping patterns- unable to sleep or sleeping all the time   · Unwillingness or inability to communicate  · Depression  · Unusual sadness, discouragement and loneliness  · Talk of wanting to die  · Neglect of personal appearance   · Rebelliousness- reckless behavior  · Withdrawal from people/activities they love  · Confusion- inability to concentrate     If you or a loved one observes any of these behaviors or has concerns about self-harm, here's what you can do:  · Talk about it- your feelings and reasons for harming yourself  · Remove any means that you might use to hurt yourself (examples: pills, rope, extension cords, firearm)  · Get professional help from the  community (Mental Health, Substance Abuse, psychological counseling)  · Do not be alone:Call your Safe Contact- someone whom you trust who will be there for you.  · Call your local CRISIS HOTLINE 928-8712 or 790-419-3666  · Call your local Children's Mobile Crisis Response Team [place] (697) 360-5530 or www.[employer]  · Call the toll free National Suicide Prevention Hotlines   · National Suicide Prevention Lifeline 050-729-TERY (7429)  · National Hope Line Network 800-SUICIDE (322-5220)

## 2020-07-18 ENCOUNTER — APPOINTMENT (OUTPATIENT)
Dept: RADIOLOGY | Facility: MEDICAL CENTER | Age: 20
End: 2020-07-18
Attending: EMERGENCY MEDICINE
Payer: COMMERCIAL

## 2020-07-18 ENCOUNTER — HOSPITAL ENCOUNTER (EMERGENCY)
Facility: MEDICAL CENTER | Age: 20
End: 2020-07-18
Attending: EMERGENCY MEDICINE
Payer: COMMERCIAL

## 2020-07-18 VITALS
RESPIRATION RATE: 18 BRPM | HEIGHT: 67 IN | HEART RATE: 94 BPM | OXYGEN SATURATION: 96 % | BODY MASS INDEX: 27.75 KG/M2 | TEMPERATURE: 99 F | WEIGHT: 176.81 LBS | DIASTOLIC BLOOD PRESSURE: 79 MMHG | SYSTOLIC BLOOD PRESSURE: 124 MMHG

## 2020-07-18 DIAGNOSIS — R07.81 PLEURITIC CHEST PAIN: ICD-10-CM

## 2020-07-18 LAB
ALBUMIN SERPL BCP-MCNC: 4.2 G/DL (ref 3.2–4.9)
ALBUMIN/GLOB SERPL: 1.4 G/DL
ALP SERPL-CCNC: 57 U/L (ref 30–99)
ALT SERPL-CCNC: 7 U/L (ref 2–50)
ANION GAP SERPL CALC-SCNC: 13 MMOL/L (ref 7–16)
AST SERPL-CCNC: 12 U/L (ref 12–45)
BASOPHILS # BLD AUTO: 0.1 % (ref 0–1.8)
BASOPHILS # BLD: 0.01 K/UL (ref 0–0.12)
BILIRUB SERPL-MCNC: 0.6 MG/DL (ref 0.1–1.5)
BUN SERPL-MCNC: 6 MG/DL (ref 8–22)
CALCIUM SERPL-MCNC: 9.4 MG/DL (ref 8.5–10.5)
CHLORIDE SERPL-SCNC: 101 MMOL/L (ref 96–112)
CO2 SERPL-SCNC: 24 MMOL/L (ref 20–33)
CREAT SERPL-MCNC: 0.63 MG/DL (ref 0.5–1.4)
EKG IMPRESSION: NORMAL
EOSINOPHIL # BLD AUTO: 0.07 K/UL (ref 0–0.51)
EOSINOPHIL NFR BLD: 0.7 % (ref 0–6.9)
ERYTHROCYTE [DISTWIDTH] IN BLOOD BY AUTOMATED COUNT: 38.2 FL (ref 35.9–50)
GLOBULIN SER CALC-MCNC: 2.9 G/DL (ref 1.9–3.5)
GLUCOSE SERPL-MCNC: 103 MG/DL (ref 65–99)
HCG SERPL QL: NEGATIVE
HCT VFR BLD AUTO: 39.1 % (ref 37–47)
HGB BLD-MCNC: 13.4 G/DL (ref 12–16)
IMM GRANULOCYTES # BLD AUTO: 0.05 K/UL (ref 0–0.11)
IMM GRANULOCYTES NFR BLD AUTO: 0.5 % (ref 0–0.9)
LYMPHOCYTES # BLD AUTO: 1.56 K/UL (ref 1–4.8)
LYMPHOCYTES NFR BLD: 16.4 % (ref 22–41)
MCH RBC QN AUTO: 32.6 PG (ref 27–33)
MCHC RBC AUTO-ENTMCNC: 34.3 G/DL (ref 33.6–35)
MCV RBC AUTO: 95.1 FL (ref 81.4–97.8)
MONOCYTES # BLD AUTO: 0.7 K/UL (ref 0–0.85)
MONOCYTES NFR BLD AUTO: 7.4 % (ref 0–13.4)
NEUTROPHILS # BLD AUTO: 7.12 K/UL (ref 2–7.15)
NEUTROPHILS NFR BLD: 74.9 % (ref 44–72)
NRBC # BLD AUTO: 0 K/UL
NRBC BLD-RTO: 0 /100 WBC
PLATELET # BLD AUTO: 193 K/UL (ref 164–446)
PMV BLD AUTO: 9.3 FL (ref 9–12.9)
POTASSIUM SERPL-SCNC: 4.1 MMOL/L (ref 3.6–5.5)
PROT SERPL-MCNC: 7.1 G/DL (ref 6–8.2)
RBC # BLD AUTO: 4.11 M/UL (ref 4.2–5.4)
SODIUM SERPL-SCNC: 138 MMOL/L (ref 135–145)
TROPONIN T SERPL-MCNC: <6 NG/L (ref 6–19)
WBC # BLD AUTO: 9.5 K/UL (ref 4.8–10.8)

## 2020-07-18 PROCEDURE — 99284 EMERGENCY DEPT VISIT MOD MDM: CPT

## 2020-07-18 PROCEDURE — 84703 CHORIONIC GONADOTROPIN ASSAY: CPT

## 2020-07-18 PROCEDURE — 71275 CT ANGIOGRAPHY CHEST: CPT

## 2020-07-18 PROCEDURE — 84484 ASSAY OF TROPONIN QUANT: CPT

## 2020-07-18 PROCEDURE — 36415 COLL VENOUS BLD VENIPUNCTURE: CPT

## 2020-07-18 PROCEDURE — 96374 THER/PROPH/DIAG INJ IV PUSH: CPT

## 2020-07-18 PROCEDURE — 700111 HCHG RX REV CODE 636 W/ 250 OVERRIDE (IP): Performed by: EMERGENCY MEDICINE

## 2020-07-18 PROCEDURE — 93005 ELECTROCARDIOGRAM TRACING: CPT | Performed by: EMERGENCY MEDICINE

## 2020-07-18 PROCEDURE — 80053 COMPREHEN METABOLIC PANEL: CPT

## 2020-07-18 PROCEDURE — 96375 TX/PRO/DX INJ NEW DRUG ADDON: CPT

## 2020-07-18 PROCEDURE — 700105 HCHG RX REV CODE 258: Performed by: EMERGENCY MEDICINE

## 2020-07-18 PROCEDURE — 700117 HCHG RX CONTRAST REV CODE 255: Performed by: EMERGENCY MEDICINE

## 2020-07-18 PROCEDURE — 85025 COMPLETE CBC W/AUTO DIFF WBC: CPT

## 2020-07-18 RX ORDER — MORPHINE SULFATE 4 MG/ML
4 INJECTION, SOLUTION INTRAMUSCULAR; INTRAVENOUS ONCE
Status: COMPLETED | OUTPATIENT
Start: 2020-07-18 | End: 2020-07-18

## 2020-07-18 RX ORDER — SODIUM CHLORIDE 9 MG/ML
1000 INJECTION, SOLUTION INTRAVENOUS ONCE
Status: COMPLETED | OUTPATIENT
Start: 2020-07-18 | End: 2020-07-18

## 2020-07-18 RX ORDER — OXYCODONE HYDROCHLORIDE AND ACETAMINOPHEN 5; 325 MG/1; MG/1
1 TABLET ORAL EVERY 4 HOURS PRN
Qty: 10 TAB | Refills: 0 | Status: SHIPPED | OUTPATIENT
Start: 2020-07-18 | End: 2020-07-21

## 2020-07-18 RX ORDER — ONDANSETRON 2 MG/ML
4 INJECTION INTRAMUSCULAR; INTRAVENOUS ONCE
Status: COMPLETED | OUTPATIENT
Start: 2020-07-18 | End: 2020-07-18

## 2020-07-18 RX ADMIN — ONDANSETRON 4 MG: 2 INJECTION INTRAMUSCULAR; INTRAVENOUS at 06:10

## 2020-07-18 RX ADMIN — SODIUM CHLORIDE 1000 ML: 9 INJECTION, SOLUTION INTRAVENOUS at 06:09

## 2020-07-18 RX ADMIN — IOHEXOL 50 ML: 350 INJECTION, SOLUTION INTRAVENOUS at 05:58

## 2020-07-18 RX ADMIN — MORPHINE SULFATE 4 MG: 4 INJECTION INTRAVENOUS at 06:10

## 2020-07-18 ASSESSMENT — FIBROSIS 4 INDEX: FIB4 SCORE: 0.36

## 2020-07-18 NOTE — ED NOTES
Patient back from Imaging    Patient testing limits, telling staff to shut up  Became angry at mother and became aggressive with staff  De-escalated and place in quiet room  IM given at [address] in left deltoid (see MARS please)  Mood extremely labile  Maintained on q 7 minute safety checks

## 2020-07-18 NOTE — ED PROVIDER NOTES
ED Provider Note    CHIEF COMPLAINT  Chief Complaint   Patient presents with   • Shoulder Pain     Pt reports sharp shoulder pain, started thursday night. Surgery on Wednesday for ovarian cyst.        HPI  Emy Francis is a 20 y.o. female who presents to the emergency department with right shoulder pain.  Patient underwent surgery 3 days ago.  The day after her surgery she started having pain in the right trapezius right anterior upper chest region.  She took Norco from her surgery.  The pain worsened today .  The pain is worse when she breathes.  She feels mildly short of breath because she does not want to take a deep breath.   it was thought that her pain was related to subdiaphragmatic air related to laparoscopy.  However because the symptoms are so severe she presents.  Pain is characterized as sharp.  It is also worse when she moves the right arm or walks.  Nonradiating.  She has not had cough or hemoptysis.  She has not had fever or chills.  No leg pain or leg swelling.    REVIEW OF SYSTEMS  As per HPI, otherwise a 10 point review of systems is negative    PAST MEDICAL HISTORY  Past Medical History:   Diagnosis Date   • AML (acute myeloblastic leukemia) (HCC)    • Cancer (HCC) 2015    leukemia       SOCIAL HISTORY  Social History     Tobacco Use   • Smoking status: Never Smoker   • Smokeless tobacco: Never Used   Substance Use Topics   • Alcohol use: Never     Frequency: Never   • Drug use: Never       SURGICAL HISTORY  Past Surgical History:   Procedure Laterality Date   • PB LAP,DIAGNOSTIC ABDOMEN Bilateral 7/15/2020    Procedure: PELVISCOPY;  Surgeon: Nazario Dotson M.D.;  Location: SURGERY SAME DAY [facility];  Service: Gynecology   • OVARIAN CYSTECTOMY Left 7/15/2020    Procedure: EXCISION, CYST, OVARY;  Surgeon: Nazario Dotson M.D.;  Location: SURGERY SAME DAY [facility];  Service: Gynecology   • INVASIVE LINE PLACEMENT PROCEDURE RM. Left 12/6/2015    port a cath       CURRENT  "MEDICATIONS  Home Medications    **Home medications have not yet been reviewed for this encounter**         ALLERGIES  Allergies   Allergen Reactions   • Other Environmental      Mosquito bites, dogs, cat fur       PHYSICAL EXAM  VITAL SIGNS: /97   Pulse (!) 109   Temp 37.6 °C (99.7 °F) (Temporal)   Resp 17   Ht 1.702 m (5' 7\")   Wt 80.2 kg (176 lb 12.9 oz)   SpO2 94%   BMI 27.69 kg/m²    Constitutional: Awake and alert  HENT:  Atraumatic, Normocephalic.Oropharynx dry mucus membranes, Nose normal inspection.   Eyes: Normal inspection  Neck: Supple  Cardiovascular: Normal heart rate, Normal rhythm.  Symmetric peripheral pulses.   Thorax & Lungs: No respiratory distress, No wheezing, No rales, No rhonchi, mild right upper chest wall tenderness.  Significant pleuritic chest pain  Abdomen: Bowel sounds normal, soft, non-distended, nontender, no mass  Skin: Warm, Dry, No rash.   Back: No tenderness, No CVA tenderness.   Extremities: No clubbing, cyanosis, edema, no Homans or cords.  Mild pain with movement of the right shoulder  Neurologic: Grossly normal   Psychiatric: Anxious appearing    RADIOLOGY/PROCEDURES  CT-CTA CHEST PULMONARY ARTERY W/ RECONS   Final Result         1.  No pulmonary embolus appreciated.   2.  Foci of air along the right hemidiaphragm, within expected limits for recent intra-abdominal surgery.           Imaging is interpreted by radiologist    Labs:  Results for orders placed or performed during the hospital encounter of 07/18/20   HCG Qual Serum   Result Value Ref Range    Beta-Hcg Qualitative Serum Negative Negative   CBC w/ Differential   Result Value Ref Range    WBC 9.5 4.8 - 10.8 K/uL    RBC 4.11 (L) 4.20 - 5.40 M/uL    Hemoglobin 13.4 12.0 - 16.0 g/dL    Hematocrit 39.1 37.0 - 47.0 %    MCV 95.1 81.4 - 97.8 fL    MCH 32.6 27.0 - 33.0 pg    MCHC 34.3 33.6 - 35.0 g/dL    RDW 38.2 35.9 - 50.0 fL    Platelet Count 193 164 - 446 K/uL    MPV 9.3 9.0 - 12.9 fL    Neutrophils-Polys " 74.90 (H) 44.00 - 72.00 %    Lymphocytes 16.40 (L) 22.00 - 41.00 %    Monocytes 7.40 0.00 - 13.40 %    Eosinophils 0.70 0.00 - 6.90 %    Basophils 0.10 0.00 - 1.80 %    Immature Granulocytes 0.50 0.00 - 0.90 %    Nucleated RBC 0.00 /100 WBC    Neutrophils (Absolute) 7.12 2.00 - 7.15 K/uL    Lymphs (Absolute) 1.56 1.00 - 4.80 K/uL    Monos (Absolute) 0.70 0.00 - 0.85 K/uL    Eos (Absolute) 0.07 0.00 - 0.51 K/uL    Baso (Absolute) 0.01 0.00 - 0.12 K/uL    Immature Granulocytes (abs) 0.05 0.00 - 0.11 K/uL    NRBC (Absolute) 0.00 K/uL   Complete Metabolic Panel (CMP)   Result Value Ref Range    Sodium 138 135 - 145 mmol/L    Potassium 4.1 3.6 - 5.5 mmol/L    Chloride 101 96 - 112 mmol/L    Co2 24 20 - 33 mmol/L    Anion Gap 13.0 7.0 - 16.0    Glucose 103 (H) 65 - 99 mg/dL    Bun 6 (L) 8 - 22 mg/dL    Creatinine 0.63 0.50 - 1.40 mg/dL    Calcium 9.4 8.5 - 10.5 mg/dL    AST(SGOT) 12 12 - 45 U/L    ALT(SGPT) 7 2 - 50 U/L    Alkaline Phosphatase 57 30 - 99 U/L    Total Bilirubin 0.6 0.1 - 1.5 mg/dL    Albumin 4.2 3.2 - 4.9 g/dL    Total Protein 7.1 6.0 - 8.2 g/dL    Globulin 2.9 1.9 - 3.5 g/dL    A-G Ratio 1.4 g/dL   TROPONIN   Result Value Ref Range    Troponin T <6 6 - 19 ng/L   ESTIMATED GFR   Result Value Ref Range    GFR If African American >60 >60 mL/min/1.73 m 2    GFR If Non African American >60 >60 mL/min/1.73 m 2   EKG (NOW)   Result Value Ref Range    Report       [facility]    Test Date:  2020  Pt Name:    AJAY ANNA            Department: ER  MRN:        [id number]                      Room:  Gender:     Female                       Technician: [id number]  :        2000                   Requested By:ER TRIAGE PROTOCOL  Order #:    [id number]                    Reading MD: ARGENIS ROBLES MD    Measurements  Intervals                                Axis  Rate:       94                           P:          27  AK:         156                          QRS:         30  QRSD:       88                           T:          11  QT:         344  QTc:        431    Interpretive Statements  SINUS RHYTHM  Compared to ECG 08/08/2016 08:13:53  No significant changes  Electronically Signed On 7- 6:24:21 PDT by ARGENIS ROBLES MD         Medications   NS infusion 1,000 mL (has no administration in time range)   morphine (pf) 4 MG/ML injection 4 mg (has no administration in time range)   ondansetron (ZOFRAN) syringe/vial injection 4 mg (has no administration in time range)       HYDRATION: Based on the patient's presentation of Tachycardia the patient was given IV fluids. IV Hydration was used because oral hydration was not adequate alone. Upon recheck following hydration, the patient was Stable.    COURSE & MEDICAL DECISION MAKING  Patient presents with pleuritic right upper chest pain and shoulder pain after surgery.  Differential includes pulmonary embolism, pneumothorax, ACS, vascular pathology otherwise, postsurgical, subdiaphragmatic air etc.  Work-up was initiated.    EKG was unremarkable.    Laboratory data was reassuring with constant symptoms and negative troponin.    CT pulmonary angiogram was negative for PE.  Subdiaphragmatic air was noted.  Patient was given morphine for pain.  She had improved symptoms.  She reports that Percocet has worked better for her than Norco in the past.  I have given her a prescription for a few Percocet tablets after review of the [employer] database.  Standard discussion for opiate prescriptions was had.    Patient will return to the ER for any difficulty breathing, fevers, worsening, uncontrolled symptoms or concern.  I asked her to call her doctor to discuss her symptoms.      FINAL IMPRESSION  1.  Pleuritic chest pain  2.  Postsurgical expected subdiaphragmatic air      This dictation was created using voice recognition software. The accuracy of the dictation is limited to the abilities of the software.  The nursing notes were reviewed and  certain aspects of this information were incorporated into this note.      Electronically signed by: Mitul Perez M.D., 7/18/2020 4:59 AM      In prescribing controlled substances to this patient, I certify that I have obtained and reviewed the medical history of Emy Francis. I have also made a good lisandro effort to obtain applicable records from other providers who have treated the patient and no other records are available at this time.     I have conducted a physical exam and documented it. I have reviewed Ms. Francis’s prescription history as maintained by the [place] Prescription Monitoring Program.     I have assessed the patient’s risk for abuse, dependency, and addiction using the validated Opioid Risk Tool available at https://www.mdcalc.com/tyezvk-nimz-qgdh-ort-narcotic-abuse.     Given the above, I believe the benefits of controlled substance therapy outweigh the risks. The reasons for prescribing controlled substances include non-narcotic, oral analgesic alternatives have been inadequate for pain control. Accordingly, I have discussed the risk and benefits, treatment plan, and alternative therapies with the patient.

## 2020-07-18 NOTE — ED TRIAGE NOTES
"Chief Complaint   Patient presents with   • Shoulder Pain     Pt reports sharp shoulder pain, started thursday night. Surgery on Wednesday for ovarian cyst.      /97   Pulse (!) 109   Temp 37.6 °C (99.7 °F) (Temporal)   Resp 17   Ht 1.702 m (5' 7\")   Wt 80.2 kg (176 lb 12.9 oz)   SpO2 94%   BMI 27.69 kg/m²     Pt to ER for above complaint. Educated on triage process, encouraged to alert staff to any change in condition,    Pt reports abdominal pain along with shoulder pain. Low grade fever this am. Reports SOB.     EKG ordered and reviewed.  "

## 2020-07-18 NOTE — ED NOTES
Patient discharged home per ERP.  Discharge teaching and education discussed with patient. POC discussed.   Patient verbalized understanding of discharge teaching and education. No other questions at this time.     RX x 1 sent to pharm by ERP.  PIV removed.     VSS. Patient alert and oriented. Patient arranged ride for self. Able to ambulate off unit safely with steady gait.

## 2020-08-03 ENCOUNTER — HOSPITAL ENCOUNTER (OUTPATIENT)
Dept: LAB | Facility: MEDICAL CENTER | Age: 20
End: 2020-08-03
Attending: OBSTETRICS & GYNECOLOGY
Payer: COMMERCIAL

## 2020-08-03 LAB
BASOPHILS # BLD AUTO: 0.3 % (ref 0–1.8)
BASOPHILS # BLD: 0.02 K/UL (ref 0–0.12)
EOSINOPHIL # BLD AUTO: 0.09 K/UL (ref 0–0.51)
EOSINOPHIL NFR BLD: 1.5 % (ref 0–6.9)
ERYTHROCYTE [DISTWIDTH] IN BLOOD BY AUTOMATED COUNT: 38.7 FL (ref 35.9–50)
HCT VFR BLD AUTO: 42.6 % (ref 37–47)
HGB BLD-MCNC: 14.2 G/DL (ref 12–16)
IMM GRANULOCYTES # BLD AUTO: 0.01 K/UL (ref 0–0.11)
IMM GRANULOCYTES NFR BLD AUTO: 0.2 % (ref 0–0.9)
LYMPHOCYTES # BLD AUTO: 1.62 K/UL (ref 1–4.8)
LYMPHOCYTES NFR BLD: 27.9 % (ref 22–41)
MCH RBC QN AUTO: 32 PG (ref 27–33)
MCHC RBC AUTO-ENTMCNC: 33.3 G/DL (ref 33.6–35)
MCV RBC AUTO: 95.9 FL (ref 81.4–97.8)
MONOCYTES # BLD AUTO: 0.39 K/UL (ref 0–0.85)
MONOCYTES NFR BLD AUTO: 6.7 % (ref 0–13.4)
NEUTROPHILS # BLD AUTO: 3.68 K/UL (ref 2–7.15)
NEUTROPHILS NFR BLD: 63.4 % (ref 44–72)
NRBC # BLD AUTO: 0 K/UL
NRBC BLD-RTO: 0 /100 WBC
PLATELET # BLD AUTO: 264 K/UL (ref 164–446)
PMV BLD AUTO: 10.1 FL (ref 9–12.9)
RBC # BLD AUTO: 4.44 M/UL (ref 4.2–5.4)
WBC # BLD AUTO: 5.8 K/UL (ref 4.8–10.8)

## 2020-08-03 PROCEDURE — 36415 COLL VENOUS BLD VENIPUNCTURE: CPT

## 2020-08-03 PROCEDURE — 85025 COMPLETE CBC W/AUTO DIFF WBC: CPT

## 2020-11-23 ENCOUNTER — HOSPITAL ENCOUNTER (OUTPATIENT)
Dept: LAB | Facility: MEDICAL CENTER | Age: 20
End: 2020-11-23
Attending: PEDIATRICS
Payer: COMMERCIAL

## 2020-11-23 DIAGNOSIS — Z85.6 HISTORY OF LEUKEMIA: ICD-10-CM

## 2020-11-23 LAB
25(OH)D3 SERPL-MCNC: 20 NG/ML (ref 30–100)
ALBUMIN SERPL BCP-MCNC: 4.3 G/DL (ref 3.2–4.9)
ALBUMIN/GLOB SERPL: 1.5 G/DL
ALP SERPL-CCNC: 67 U/L (ref 30–99)
ALT SERPL-CCNC: 13 U/L (ref 2–50)
ANION GAP SERPL CALC-SCNC: 8 MMOL/L (ref 7–16)
AST SERPL-CCNC: 17 U/L (ref 12–45)
BASOPHILS # BLD AUTO: 0.3 % (ref 0–1.8)
BASOPHILS # BLD: 0.02 K/UL (ref 0–0.12)
BILIRUB SERPL-MCNC: 0.3 MG/DL (ref 0.1–1.5)
BUN SERPL-MCNC: 11 MG/DL (ref 8–22)
CALCIUM SERPL-MCNC: 9.4 MG/DL (ref 8.5–10.5)
CHLORIDE SERPL-SCNC: 105 MMOL/L (ref 96–112)
CO2 SERPL-SCNC: 25 MMOL/L (ref 20–33)
CREAT SERPL-MCNC: 0.61 MG/DL (ref 0.5–1.4)
EOSINOPHIL # BLD AUTO: 0.14 K/UL (ref 0–0.51)
EOSINOPHIL NFR BLD: 2 % (ref 0–6.9)
ERYTHROCYTE [DISTWIDTH] IN BLOOD BY AUTOMATED COUNT: 41.8 FL (ref 35.9–50)
FASTING STATUS PATIENT QL REPORTED: NORMAL
GLOBULIN SER CALC-MCNC: 2.8 G/DL (ref 1.9–3.5)
GLUCOSE SERPL-MCNC: 91 MG/DL (ref 65–99)
HCT VFR BLD AUTO: 44.7 % (ref 37–47)
HGB BLD-MCNC: 14.8 G/DL (ref 12–16)
IMM GRANULOCYTES # BLD AUTO: 0.02 K/UL (ref 0–0.11)
IMM GRANULOCYTES NFR BLD AUTO: 0.3 % (ref 0–0.9)
LYMPHOCYTES # BLD AUTO: 2.04 K/UL (ref 1–4.8)
LYMPHOCYTES NFR BLD: 29.5 % (ref 22–41)
MCH RBC QN AUTO: 32.6 PG (ref 27–33)
MCHC RBC AUTO-ENTMCNC: 33.1 G/DL (ref 33.6–35)
MCV RBC AUTO: 98.5 FL (ref 81.4–97.8)
MONOCYTES # BLD AUTO: 0.49 K/UL (ref 0–0.85)
MONOCYTES NFR BLD AUTO: 7.1 % (ref 0–13.4)
NEUTROPHILS # BLD AUTO: 4.21 K/UL (ref 2–7.15)
NEUTROPHILS NFR BLD: 60.8 % (ref 44–72)
NRBC # BLD AUTO: 0 K/UL
NRBC BLD-RTO: 0 /100 WBC
PLATELET # BLD AUTO: 255 K/UL (ref 164–446)
PMV BLD AUTO: 9.8 FL (ref 9–12.9)
POTASSIUM SERPL-SCNC: 4.3 MMOL/L (ref 3.6–5.5)
PROT SERPL-MCNC: 7.1 G/DL (ref 6–8.2)
RBC # BLD AUTO: 4.54 M/UL (ref 4.2–5.4)
SODIUM SERPL-SCNC: 138 MMOL/L (ref 135–145)
WBC # BLD AUTO: 6.9 K/UL (ref 4.8–10.8)

## 2020-11-23 PROCEDURE — 80053 COMPREHEN METABOLIC PANEL: CPT

## 2020-11-23 PROCEDURE — 85025 COMPLETE CBC W/AUTO DIFF WBC: CPT

## 2020-11-23 PROCEDURE — 82306 VITAMIN D 25 HYDROXY: CPT

## 2020-11-23 PROCEDURE — 36415 COLL VENOUS BLD VENIPUNCTURE: CPT

## 2020-11-30 ENCOUNTER — TELEMEDICINE (OUTPATIENT)
Dept: PEDIATRIC HEMATOLOGY/ONCOLOGY | Facility: OUTPATIENT CENTER | Age: 20
End: 2020-11-30
Payer: COMMERCIAL

## 2020-11-30 DIAGNOSIS — Z85.6 HISTORY OF LEUKEMIA: ICD-10-CM

## 2020-11-30 DIAGNOSIS — F50.89 OTHER DISORDER OF EATING: ICD-10-CM

## 2020-11-30 DIAGNOSIS — E55.9 VITAMIN D DEFICIENCY: ICD-10-CM

## 2020-11-30 PROCEDURE — 99213 OFFICE O/P EST LOW 20 MIN: CPT | Mod: 95 | Performed by: PEDIATRICS

## 2020-12-01 NOTE — PROGRESS NOTES
Due to COVID-19 restrictions, telehealth medicine visit has been arranged either via Epic Zoom.     Telehealth Visit Start Time: 1:25 PM 2020  Telehealth Visit Stop Time:  1:55 PM 2020  Time Elapsed:  30 minutes were spent face to face with the patient of which > 50% were spent in direct counseling and coordination of care.    Telehealth Visit Method:  Epic/Zoom  Consent Obtained: Yes, verbal    Consenting Party:  Patient    Pediatric Hematology/Oncology Clinic  Telemedicine Progress Note      Patient Name:  Emy Francis  : 2000   MRN: [id number]    Location of Service: Telehealth Visit Due to COVID-19 Restrictions  Date of Service: 2020  Time: 1:00 PM    Primary Care Physician: Cadence Morales M.D.    HISTORY OF PRESENT ILLNESS:     Chief Complaint: ON STUDY Follow-up [id number](OS), 51 months off therapy    History of Present Illness: Emy Francis is a 20 y.o. female who presents for telehealth visit today for follow-up of her history of Acute Promyelocytic Leukemia, now 51 months off therapy.  Emy presents by herself and provides accurate clinical and interval history.    Briefly, Emy was otherwise healthy until mid-2015 when she began to develop fatigue and a headache.  She also noticed at the time a bruise on the left side of her breast.  Over the next couple of weeks, the headaches worsened and she began to develop more symptoms including a constant bloody nose and light headedness.  She also began to develop petechiae on her legs, arms and shoulders.  The bruising worsened and she began to develop shortness of breath.  She was brought to her pediatrician's office with gum bleeding, lightheadedness and bloody urin on 11/30/15.  A routine set of labs were obtained, but before they had resulted, Emy was brought to the hospital with increasing shortness of breath.  Dr. Joseph saw her upon admission and informed her that her counts were all low and she would  be transferred to [facility] for work-up and treatment.  She was transferred on 12/1/15 and a leukemia work-up was started.  A bone marrow evaluation at [facility] was remarkable for acute promyelocytic leukemia.  Her initial lumbar puncture was negative for CSF disease.  Flow cytometry was strongly positive for CD13 and CD33; mild positivity for HLA-DR and CD34.  FISH for t(15,17) was confirmed.  Pre-treatment with all-trans retinoic acid (ATRA) was initiated prior to confirmatory results on 12/3/15.  Initial supportive care also included PRBC transfusion.  Work-up was complicated by what Emy describes as a spinal headache which lasted the entire first moth of treatment.  Following confirmatory results, Emy was consented for and enrolled on [facility] [id number]. Induction with arsenic trioxide and ATRA was started started 12/5/15 and completed 1/4/16.  Induction was not only complicated by a persistent headache, but Emy also experienced rising white blood cell count requiring the initiation of hydroxyurea and decadron on 12/15/15 and ending on 12/24/15 when WBC finally dropped below 10,000.  Persistent headaches forced ATRA to be held 12/25/15 and restarted on 12/26/15 at 75% dosing.  Although there was breif improvement, the following week, 1/4/16 Emy developed double vision and esotropia in her right eye.  Ophthalmologic examination was remarkable for papilledema and ATRA again was held.  Ultimately she would be treated with Diamox for pseudotumor cerebri resulting from ATRA therapy.  I  Induction was also complicated by coagulopathy mild coagulopathy, spinal headaches, nausea/vomitting, plantar palmar erythrodysesthesia, capillary leak syndrome, skin hypersensitivity and steroid intolerance. Post-Induction evaluation was unremarkable for residual disease.  Consolidation was scheduled for start of 1/18/16, but was delayed two weeks due to myelosuppression and started 2/1/16.  The remainder  of Consolidation Cycle 1 was unremarkable and primarily therapy was given in [place].  Consolidation Cycle 2 was started as scheduled 3/28/16 and Emy's end of Cycle 2 bone marrow evaluation was unremarkable for any MRD (PML-RAR?).  Consolidation Cycles 3 and 4 were unremarkable and therapy was completed 8/17/16.  Emy was seen in follow-up for the first 5 months off therapy by Dr. Joseph in [place].  During this period of time she was weaned from her Diamox and there have been no further complications of her therapy.    Her off therapy care was then transferred to [facility].  Follow-up reporting has continued to date for her study.    Today, Emy joins by [facility] for telemedicine visit for her 51 months off therapy visit.  She reports that overall she is doing exceptionally well, the best that she is ever been since her diagnosis.  She states that she has very good energy and has been quite active.  She continues to lift weights and exercise regularly.  She reports that many of her lingering symptoms have now resolved.  She denies having any headaches whatsoever, denies having any aches or pains in her hips or back.  She does report occasional abdominal pain however attributes this to erratic menstruation and IUD. Emy has recently moved out and is now living with her best friend and boyfriend.  She reports that the living situation is very good for her.  She continues to attend classes which have now been moved to distance learning and online only and no longer is on campus for her labs.  No complaints of any changes in vision or neurologic status changes.  No concerns for learning disability.  No issues with behavior.  Occasional alcohol use however no smoking and no drug use.  Is currently in a relationship and sexually active and uses IUD as birth control method.  Emy does report that she is currently seen a counselor for disordered eating and is doing exceptionally well with this.  She  states that she continues to cook for her self and make healthy choices.  She reports that her weight has appropriately increased to a healthy weight.  No complaints of any skin changes or rashes.  Only taking Benadryl at night for disruptive sleep.  No other medicines.  No other concerns or complaints at this time.    Review of Systems:     Constitutional: Afebrile.  No recent or remote illness.  Energy and activity are at their best since diagnosis.  Appetite and oral intake is good.  HENT: Negative.  Eyes: Negative for visual changes.  Respiratory: Negative for  shortness of breath.  Cardiovascular: Negative.  Gastrointestinal: Negative for nausea, vomiting, abdominal pain, diarrhea, constipation.  Occasional abdominal discomfort thought to be related to periods.  Genitourinary: Negative.  IUD in place.  Musculoskeletal: Negative.  Skin: Negative for rash, signs of infection.  Neurological: Negative for numbness, tingling, sensory changes, weakness or headaches.    Endo/Heme/Allergies: Does not bruise/bleed easily.     Psychiatric/Behavioral: No changes in mood, appropriate for age.     PAST MEDICAL HISTORY:     Past Medical History:     1) Previously health, only one episode of otitis media as a child  2) Acute Promyelocytic Leukemia (APML) diagnosis  3) No hospitalizations or surgeries prior to diagnosis of APML  4) Seasonal allergies  5) Obesity (RESOLVED)  6) Ovarian Cyst  7) Disordered Eating       Past Surgical History:      1) Therapy related bone marrow aspiration, lumbar punctures  2) Port-a-cath placement and removal     Birth/Developmental History:     1st of 2 children  No complications of pregnancy, good prenatal care  Delivered at 39 weeks EGA, no complications of delivery, however transferred to the NICU for temperature of 103F - stayed for 10 days  No further complications  Never any concerns for growth and development  Has always met milestones  Excels in school      Menstrual History:  No  "menses, IUD currently in place.     Allergies:         Allergies as of 08/15/2018   • (No Known Allergies)      Social History:   Lives at home with mother, father and younger brother.  All are healthy and well. 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     Family History:      1) Maternal grandfather with Stage IV gastric Ca.  2) Paternal grandfather with \"pre-kidney cancer\"  3) Cousin with lupus  4) Family history of HTN  5) No childhood cancers, no childhood unexpected deaths or illness, no rheumatologicc disease, bleeding or clotting disorders.     Immunizations:  Up to date.    Medications: Benadryl nightly as a sleep aid    OBJECTIVE:     Vitals: No vitals assessed due to visit being a telehealth visit.    Labs:  No visits with results within 2 Day(s) from this visit.   Latest known visit with results is:   Hospital Outpatient Visit on 11/23/2020   Component Date Value   • 25-Hydroxy   Vitamin D 25 11/23/2020 20*   • Sodium 11/23/2020 138    • Potassium 11/23/2020 4.3    • Chloride 11/23/2020 105    • Co2 11/23/2020 25    • Anion Gap 11/23/2020 8.0    • Glucose 11/23/2020 91    • Bun 11/23/2020 11    • Creatinine 11/23/2020 0.61    • Calcium 11/23/2020 9.4    • AST(SGOT) 11/23/2020 17    • ALT(SGPT) 11/23/2020 13    • Alkaline Phosphatase 11/23/2020 67    • Total Bilirubin 11/23/2020 0.3    • Albumin 11/23/2020 4.3    • Total Protein 11/23/2020 7.1    • Globulin 11/23/2020 2.8    • A-G Ratio 11/23/2020 1.5    • WBC 11/23/2020 6.9    • RBC 11/23/2020 4.54    • Hemoglobin 11/23/2020 14.8    • Hematocrit 11/23/2020 44.7    • MCV 11/23/2020 98.5*   • MCH 11/23/2020 32.6    • MCHC 11/23/2020 33.1*   • RDW 11/23/2020 41.8    • Platelet Count 11/23/2020 255    • MPV 11/23/2020 9.8    • Neutrophils-Polys 11/23/2020 60.80    • Lymphocytes 11/23/2020 29.50    • Monocytes 11/23/2020 7.10    • Eosinophils 11/23/2020 2.00    • Basophils 11/23/2020 0.30    • Immature Granulocytes 11/23/2020 0.30    • Nucleated " RBC 11/23/2020 0.00    • Neutrophils (Absolute) 11/23/2020 4.21    • Lymphs (Absolute) 11/23/2020 2.04    • Monos (Absolute) 11/23/2020 0.49    • Eos (Absolute) 11/23/2020 0.14    • Baso (Absolute) 11/23/2020 0.02    • Immature Granulocytes (a* 11/23/2020 0.02    • NRBC (Absolute) 11/23/2020 0.00    • Fasting Status 11/23/2020 Non-Fasting    • GFR If  11/23/2020 >60    • GFR If Non  Ameri* 11/23/2020 >60      Physical Exam (ABBREVIATED DUE TO TELEHEALTH):    Constitutional: Well-developed, well-nourished, and in no distress.  Very well appearing.  HENT: Normocephalic and atraumatic. Lips pink.  Oral mucosa appears moist.  Eyes: EOMI.  Non-icteric.  Neck: No visible masses.  Pulmonary/Chest: Breathing comfortably.  Musculoskeletal: Moving all extremities.  Neurological: Alert and oriented to person and place. Gait normal. Coordination normal.    Skin: Skin is pink without any visible rashes on exposed skin.  No pallor.   Psychiatric: Mood and affect normal for age.    ASSESSMENT AND PLAN:     Emy Francis is a 20 year old previously healthy female with a history of APML, treated on [id number], now  51 months off therapy for follow-up     1) Acute Promyelocytic Leukemia, History:              - Completed therapy ON STUDY [id number], now 51 months off therapy              - No clinical evidence of disease, no laboratory evidence of disease              - WBC 6.9, Hgb  14.8, platelets 255 normal and reassuring (MCV remains elevated and in fact is higher than previous at 98.5)              - No physical evidence of disease              - Will RTC 12 months - continue data submission for on study [id number](OS)     2) Headaches (RESOLVED):         3) History of Hypertension:              - Blood pressure not obtained today due to telehealth   - Emy reports normal blood pressures at visits with other providers as well as normal blood pressures at home which she states she measures frequently  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     4) Disordered Eating:              - Previously obese at end of therapy, with significant symptoms related to obesity              - Starting in 2019 with rapid weight loss and elevated MCV concerning for disordered eating   - Findings were discussed with patient at that time   - Diagnosed with disordered eating recently, currently in treatment doing well     5) Joint Pains (RESOLVED):            6) History of Vitamin D Deficiency:              - Vitamin D today improved to 20   - Discussed bone health and encouraged vitamin D and calcium supplementation     7) Survivorship/Late Effects Monitoring:              - Cognitive:  No cognitive concerns at this time.  Doing well in school despite distance learning.                - Psychosocial:  No reports of behavioral issues.              - Renal:  Blood pressure  not obtained today but reported as normal at home. Creatinine 0.61 (baseline).  Electrolytes normal.              - Cardiac:  ECHO yearly to 10 years.  Will need ECHO again this year.  Orders placed.  Emy reports that she will obtain ECHO in July as she normally does.              - Pulmonary:  No chest/mediastinal radiation.  Clinical exam unremarkable.              - Musckuloskeletal:  High dose steroid exposure during therapy.  History of low vitamin D levels, continue on Vitamin D - Calcium supplementation with daily chews.  Vitamin D level today 20              - Growth and Development: Weight not obtained.  Doing better with regard to disordered eating.              - Reproductive:  IUD in place.   Will obtain anti-müllerian hormone level as baseline              - Performance:  Karnofsky 100 / ECOG 1     Follow up for COG [id number]:       End of Therapy Follow-up Relapse   Physical Exam with VS X Monthly to 12 months  Q3 months to 36 months  Q6 months to 48 months  Yearly to 10 years X   Ht, Wt, BSA                          X   Performance Status X   X    CBC, Diff, Plts X Monthly to 12 months  Q3 months to 36 months  Q6 months to 48 months  Yearly to 10 years X   Electrolytes (Ca++, Mg++, K+) and Creatinine     X   Uric Acid     X   ECHO   X Yearly to 10 years X   BMA X   X         Disposition:  Return to clinic in 12 months for 63 month off therapy evaluation, PE, and labs          Heriberto Hylton MD  Pediatric Hematology / Oncology  [facility].  [phone]  Office. [phone]

## 2021-04-23 ENCOUNTER — HOSPITAL ENCOUNTER (OUTPATIENT)
Dept: LAB | Facility: MEDICAL CENTER | Age: 21
End: 2021-04-23
Attending: FAMILY MEDICINE
Payer: COMMERCIAL

## 2021-04-23 LAB
ALBUMIN SERPL BCP-MCNC: 4.7 G/DL (ref 3.2–4.9)
ALBUMIN/GLOB SERPL: 1.6 G/DL
ALP SERPL-CCNC: 75 U/L (ref 30–99)
ALT SERPL-CCNC: 15 U/L (ref 2–50)
ANION GAP SERPL CALC-SCNC: 12 MMOL/L (ref 7–16)
APPEARANCE UR: CLEAR
AST SERPL-CCNC: 17 U/L (ref 12–45)
BASOPHILS # BLD AUTO: 0.2 % (ref 0–1.8)
BASOPHILS # BLD: 0.01 K/UL (ref 0–0.12)
BILIRUB SERPL-MCNC: 0.8 MG/DL (ref 0.1–1.5)
BILIRUB UR QL STRIP.AUTO: NEGATIVE
BUN SERPL-MCNC: 12 MG/DL (ref 8–22)
CALCIUM SERPL-MCNC: 9.8 MG/DL (ref 8.5–10.5)
CHLORIDE SERPL-SCNC: 105 MMOL/L (ref 96–112)
CO2 SERPL-SCNC: 22 MMOL/L (ref 20–33)
COLOR UR: YELLOW
CREAT SERPL-MCNC: 0.68 MG/DL (ref 0.5–1.4)
EOSINOPHIL # BLD AUTO: 0.16 K/UL (ref 0–0.51)
EOSINOPHIL NFR BLD: 2.5 % (ref 0–6.9)
ERYTHROCYTE [DISTWIDTH] IN BLOOD BY AUTOMATED COUNT: 40.6 FL (ref 35.9–50)
GLOBULIN SER CALC-MCNC: 3 G/DL (ref 1.9–3.5)
GLUCOSE SERPL-MCNC: 79 MG/DL (ref 65–99)
GLUCOSE UR STRIP.AUTO-MCNC: NEGATIVE MG/DL
HCT VFR BLD AUTO: 47.5 % (ref 37–47)
HGB BLD-MCNC: 15.4 G/DL (ref 12–16)
IMM GRANULOCYTES # BLD AUTO: 0.03 K/UL (ref 0–0.11)
IMM GRANULOCYTES NFR BLD AUTO: 0.5 % (ref 0–0.9)
KETONES UR STRIP.AUTO-MCNC: NEGATIVE MG/DL
LEUKOCYTE ESTERASE UR QL STRIP.AUTO: NEGATIVE
LYMPHOCYTES # BLD AUTO: 1.78 K/UL (ref 1–4.8)
LYMPHOCYTES NFR BLD: 28.1 % (ref 22–41)
MCH RBC QN AUTO: 31.4 PG (ref 27–33)
MCHC RBC AUTO-ENTMCNC: 32.4 G/DL (ref 33.6–35)
MCV RBC AUTO: 96.9 FL (ref 81.4–97.8)
MICRO URNS: NORMAL
MONOCYTES # BLD AUTO: 0.45 K/UL (ref 0–0.85)
MONOCYTES NFR BLD AUTO: 7.1 % (ref 0–13.4)
NEUTROPHILS # BLD AUTO: 3.9 K/UL (ref 2–7.15)
NEUTROPHILS NFR BLD: 61.6 % (ref 44–72)
NITRITE UR QL STRIP.AUTO: NEGATIVE
NRBC # BLD AUTO: 0 K/UL
NRBC BLD-RTO: 0 /100 WBC
PH UR STRIP.AUTO: 8 [PH] (ref 5–8)
PLATELET # BLD AUTO: 257 K/UL (ref 164–446)
PMV BLD AUTO: 10.1 FL (ref 9–12.9)
POTASSIUM SERPL-SCNC: 4 MMOL/L (ref 3.6–5.5)
PROT SERPL-MCNC: 7.7 G/DL (ref 6–8.2)
PROT UR QL STRIP: NEGATIVE MG/DL
RBC # BLD AUTO: 4.9 M/UL (ref 4.2–5.4)
RBC UR QL AUTO: NEGATIVE
SODIUM SERPL-SCNC: 139 MMOL/L (ref 135–145)
SP GR UR STRIP.AUTO: 1.02
UROBILINOGEN UR STRIP.AUTO-MCNC: 0.2 MG/DL
WBC # BLD AUTO: 6.3 K/UL (ref 4.8–10.8)

## 2021-04-23 PROCEDURE — 85025 COMPLETE CBC W/AUTO DIFF WBC: CPT

## 2021-04-23 PROCEDURE — 81003 URINALYSIS AUTO W/O SCOPE: CPT

## 2021-04-23 PROCEDURE — 36415 COLL VENOUS BLD VENIPUNCTURE: CPT

## 2021-04-23 PROCEDURE — 80053 COMPREHEN METABOLIC PANEL: CPT

## 2021-04-26 ENCOUNTER — HOSPITAL ENCOUNTER (OUTPATIENT)
Facility: MEDICAL CENTER | Age: 21
End: 2021-04-26
Attending: FAMILY MEDICINE
Payer: COMMERCIAL

## 2021-04-26 PROCEDURE — 87338 HPYLORI STOOL AG IA: CPT

## 2021-04-27 LAB — H PYLORI AG STL QL IA: DETECTED

## 2021-06-29 ENCOUNTER — TELEPHONE (OUTPATIENT)
Dept: PEDIATRIC HEMATOLOGY/ONCOLOGY | Facility: OUTPATIENT CENTER | Age: 21
End: 2021-06-29

## 2021-06-29 DIAGNOSIS — Z85.6 HISTORY OF LEUKEMIA: ICD-10-CM

## 2021-07-09 DIAGNOSIS — Z85.6 HISTORY OF LEUKEMIA: ICD-10-CM

## 2021-07-12 ENCOUNTER — HOSPITAL ENCOUNTER (OUTPATIENT)
Dept: LAB | Facility: MEDICAL CENTER | Age: 21
End: 2021-07-12
Attending: PEDIATRICS
Payer: COMMERCIAL

## 2021-07-12 DIAGNOSIS — Z85.6 HISTORY OF LEUKEMIA: ICD-10-CM

## 2021-07-12 LAB
ALBUMIN SERPL BCP-MCNC: 4.6 G/DL (ref 3.2–4.9)
ALBUMIN/GLOB SERPL: 1.6 G/DL
ALP SERPL-CCNC: 82 U/L (ref 30–99)
ALT SERPL-CCNC: 18 U/L (ref 2–50)
ANION GAP SERPL CALC-SCNC: 11 MMOL/L (ref 7–16)
AST SERPL-CCNC: 17 U/L (ref 12–45)
BASOPHILS # BLD AUTO: 0.4 % (ref 0–1.8)
BASOPHILS # BLD: 0.03 K/UL (ref 0–0.12)
BILIRUB SERPL-MCNC: 0.4 MG/DL (ref 0.1–1.5)
BUN SERPL-MCNC: 11 MG/DL (ref 8–22)
CALCIUM SERPL-MCNC: 9.6 MG/DL (ref 8.5–10.5)
CHLORIDE SERPL-SCNC: 106 MMOL/L (ref 96–112)
CO2 SERPL-SCNC: 24 MMOL/L (ref 20–33)
CREAT SERPL-MCNC: 0.6 MG/DL (ref 0.5–1.4)
EOSINOPHIL # BLD AUTO: 0.17 K/UL (ref 0–0.51)
EOSINOPHIL NFR BLD: 2.5 % (ref 0–6.9)
ERYTHROCYTE [DISTWIDTH] IN BLOOD BY AUTOMATED COUNT: 38.2 FL (ref 35.9–50)
GLOBULIN SER CALC-MCNC: 2.9 G/DL (ref 1.9–3.5)
GLUCOSE SERPL-MCNC: 86 MG/DL (ref 65–99)
HCT VFR BLD AUTO: 44.8 % (ref 37–47)
HGB BLD-MCNC: 15.5 G/DL (ref 12–16)
IMM GRANULOCYTES # BLD AUTO: 0.02 K/UL (ref 0–0.11)
IMM GRANULOCYTES NFR BLD AUTO: 0.3 % (ref 0–0.9)
LYMPHOCYTES # BLD AUTO: 1.97 K/UL (ref 1–4.8)
LYMPHOCYTES NFR BLD: 28.6 % (ref 22–41)
MCH RBC QN AUTO: 32.5 PG (ref 27–33)
MCHC RBC AUTO-ENTMCNC: 34.6 G/DL (ref 33.6–35)
MCV RBC AUTO: 93.9 FL (ref 81.4–97.8)
MONOCYTES # BLD AUTO: 0.42 K/UL (ref 0–0.85)
MONOCYTES NFR BLD AUTO: 6.1 % (ref 0–13.4)
NEUTROPHILS # BLD AUTO: 4.28 K/UL (ref 2–7.15)
NEUTROPHILS NFR BLD: 62.1 % (ref 44–72)
NRBC # BLD AUTO: 0 K/UL
NRBC BLD-RTO: 0 /100 WBC
PLATELET # BLD AUTO: 260 K/UL (ref 164–446)
PMV BLD AUTO: 9.8 FL (ref 9–12.9)
POTASSIUM SERPL-SCNC: 4.5 MMOL/L (ref 3.6–5.5)
PROT SERPL-MCNC: 7.5 G/DL (ref 6–8.2)
RBC # BLD AUTO: 4.77 M/UL (ref 4.2–5.4)
SODIUM SERPL-SCNC: 141 MMOL/L (ref 135–145)
WBC # BLD AUTO: 6.9 K/UL (ref 4.8–10.8)

## 2021-07-12 PROCEDURE — 36415 COLL VENOUS BLD VENIPUNCTURE: CPT

## 2021-07-12 PROCEDURE — 80053 COMPREHEN METABOLIC PANEL: CPT

## 2021-07-12 PROCEDURE — 85025 COMPLETE CBC W/AUTO DIFF WBC: CPT

## 2021-09-28 ENCOUNTER — OFFICE VISIT (OUTPATIENT)
Dept: URGENT CARE | Facility: PHYSICIAN GROUP | Age: 21
End: 2021-09-28
Payer: COMMERCIAL

## 2021-09-28 VITALS
WEIGHT: 191.4 LBS | OXYGEN SATURATION: 98 % | SYSTOLIC BLOOD PRESSURE: 130 MMHG | HEIGHT: 67 IN | HEART RATE: 80 BPM | DIASTOLIC BLOOD PRESSURE: 88 MMHG | BODY MASS INDEX: 30.04 KG/M2 | TEMPERATURE: 99.4 F | RESPIRATION RATE: 16 BRPM

## 2021-09-28 DIAGNOSIS — A08.4 VIRAL GASTROENTERITIS: ICD-10-CM

## 2021-09-28 DIAGNOSIS — R10.84 GENERALIZED ABDOMINAL CRAMPING: ICD-10-CM

## 2021-09-28 DIAGNOSIS — R11.2 NON-INTRACTABLE VOMITING WITH NAUSEA, UNSPECIFIED VOMITING TYPE: ICD-10-CM

## 2021-09-28 DIAGNOSIS — R19.7 DIARRHEA, UNSPECIFIED TYPE: ICD-10-CM

## 2021-09-28 PROCEDURE — 99203 OFFICE O/P NEW LOW 30 MIN: CPT | Performed by: NURSE PRACTITIONER

## 2021-09-28 RX ORDER — COVID-19 MOLECULAR TEST ASSAY
KIT MISCELLANEOUS
COMMUNITY
Start: 2021-08-08 | End: 2021-09-28

## 2021-09-28 ASSESSMENT — ENCOUNTER SYMPTOMS
FEVER: 0
FLANK PAIN: 0
ORTHOPNEA: 0
SHORTNESS OF BREATH: 0
WEAKNESS: 0
ABDOMINAL PAIN: 0
PALPITATIONS: 0
MYALGIAS: 0
CHILLS: 0
HEARTBURN: 0
DIZZINESS: 0
DIARRHEA: 1
NAUSEA: 0
CONSTIPATION: 0
VOMITING: 0
HEADACHES: 0

## 2021-09-28 ASSESSMENT — FIBROSIS 4 INDEX: FIB4 SCORE: 0.32

## 2021-09-28 NOTE — LETTER
September 28, 2021         Patient: Emy Francis   YOB: 2000   Date of Visit: 9/28/2021           To Whom it May Concern:    Emy Francis was seen in my clinic on 9/28/2021. Please excuse from class tonight as she was seen in clinic for non-contagious respiratory illness.     If you have any questions or concerns, please don't hesitate to call.        Sincerely,           SIMON Plascencia.  Electronically Signed

## 2021-09-29 NOTE — PROGRESS NOTES
Subjective     mEy Francis is a 21 y.o. female who presents with Diarrhea (nausea, vomiting, abdominal cramping, fatigue,  x8 days )            HPI  Diarrhea x 1 week. History of chemo for leukemia in 2016, none since. LMP started today. Taking no over the counter meds for this. Modified diet. Fluid intake good. No abdominal surgeries, but had left ovary removed. Has had history of constipation with chemo. No problems with diarrhea or colon problems. No GERD. Diffuse lower abdominal pain. Diarrhea 3-6x/days but this is now loose as well as solid. Does not related diarrhea with food intake. Recently had trip to [place] x 1 week, returned last week when diarrhea started.     PMH:  has a past medical history of AML (acute myeloblastic leukemia) (HCC) and Cancer (HCC) (2015).  MEDS: No current outpatient medications on file.  ALLERGIES:   Allergies   Allergen Reactions   • Other Environmental      Mosquito bites, dogs, cat fur     SURGHX:   Past Surgical History:   Procedure Laterality Date   • PB LAP,DIAGNOSTIC ABDOMEN Bilateral 7/15/2020    Procedure: PELVISCOPY;  Surgeon: Nazario Dotson M.D.;  Location: SURGERY SAME DAY [facility];  Service: Gynecology   • OVARIAN CYSTECTOMY Left 7/15/2020    Procedure: EXCISION, CYST, OVARY;  Surgeon: Nazario Dotson M.D.;  Location: SURGERY SAME DAY [facility];  Service: Gynecology   • INVASIVE LINE PLACEMENT PROCEDURE RM. Left 12/6/2015    port a cath     SOCHX:  reports that she has never smoked. She has never used smokeless tobacco. She reports current alcohol use. She reports that she does not use drugs.  FH: Family history was reviewed, no pertinent findings to report    Review of Systems   Constitutional: Negative for chills, fever and malaise/fatigue.   Respiratory: Negative for shortness of breath.    Cardiovascular: Negative for chest pain, palpitations and orthopnea.   Gastrointestinal: Positive for diarrhea. Negative for abdominal pain, constipation,  "heartburn, nausea and vomiting.   Genitourinary: Negative for dysuria, flank pain, frequency, hematuria and urgency.   Musculoskeletal: Negative for myalgias.   Neurological: Negative for dizziness, weakness and headaches.   All other systems reviewed and are negative.             Objective     /88 (BP Location: Right arm, Patient Position: Sitting, BP Cuff Size: Adult)   Pulse 80   Temp 37.4 °C (99.4 °F) (Temporal)   Resp 16   Ht 1.702 m (5' 7\")   Wt 86.8 kg (191 lb 6.4 oz)   SpO2 98%   BMI 29.98 kg/m²      Physical Exam  Vitals reviewed.   Constitutional:       General: She is awake. She is not in acute distress.     Appearance: Normal appearance. She is well-developed. She is not ill-appearing, toxic-appearing or diaphoretic.   HENT:      Head: Normocephalic.   Eyes:      Conjunctiva/sclera: Conjunctivae normal.      Pupils: Pupils are equal, round, and reactive to light.   Cardiovascular:      Rate and Rhythm: Normal rate.   Pulmonary:      Effort: Pulmonary effort is normal.   Abdominal:      General: Bowel sounds are normal. There is no distension.      Palpations: Abdomen is soft.      Tenderness: There is no abdominal tenderness. There is no guarding or rebound.   Musculoskeletal:         General: Normal range of motion.      Cervical back: Normal range of motion and neck supple.   Skin:     General: Skin is warm and dry.   Neurological:      Mental Status: She is alert and oriented to person, place, and time.   Psychiatric:         Attention and Perception: Attention normal.         Mood and Affect: Mood normal.         Speech: Speech normal.         Behavior: Behavior normal. Behavior is cooperative.                             Assessment & Plan                  1. Diarrhea, unspecified type      2. Non-intractable vomiting with nausea, unspecified vomiting type      3. Generalized abdominal cramping      4. Viral gastroenteritis    -May use over the counter Immodium as directed for " diarrhea  -Maintain water status slowly with sips of water and electrolyte fluid, increase to larger amounts as tolerated  -Introduce solid foods when diarrhea ceases and becomes firmer without abdominal cramping. Eat items from the BRAT diet- trying small amount with one item at a time  -May use Lanolin, diaper rash ointment or Vaseline to soothe anus for raw skin  -Monitor for fever, increased abdominal pain, nausea/vomiting, lethargy, continued diarrhea- need re-evaluation

## 2021-10-27 ENCOUNTER — APPOINTMENT (RX ONLY)
Dept: URBAN - METROPOLITAN AREA CLINIC 22 | Facility: CLINIC | Age: 21
Setting detail: DERMATOLOGY
End: 2021-10-27

## 2021-10-27 DIAGNOSIS — L65.9 NONSCARRING HAIR LOSS, UNSPECIFIED: ICD-10-CM

## 2021-10-27 DIAGNOSIS — L74.51 PRIMARY FOCAL HYPERHIDROSIS: ICD-10-CM

## 2021-10-27 PROBLEM — L74.512 PRIMARY FOCAL HYPERHIDROSIS, PALMS: Status: ACTIVE | Noted: 2021-10-27

## 2021-10-27 PROCEDURE — ? ORDER TESTS

## 2021-10-27 PROCEDURE — ? LAB REPORTS REVIEWED

## 2021-10-27 PROCEDURE — ? MEDICATION COUNSELING

## 2021-10-27 PROCEDURE — ? COUNSELING

## 2021-10-27 PROCEDURE — 99204 OFFICE O/P NEW MOD 45 MIN: CPT

## 2021-10-27 PROCEDURE — ? PRESCRIPTION

## 2021-10-27 RX ORDER — GLYCOPYRROLATE 1 MG/1
TABLET ORAL
Qty: 60 | Refills: 1 | Status: ERX | COMMUNITY
Start: 2021-10-27

## 2021-10-27 RX ADMIN — GLYCOPYRROLATE 3: 1 TABLET ORAL at 00:00

## 2021-10-27 ASSESSMENT — LOCATION DETAILED DESCRIPTION DERM
LOCATION DETAILED: LEFT ULNAR PALM
LOCATION DETAILED: RIGHT ULNAR PALM
LOCATION DETAILED: RIGHT MEDIAL FRONTAL SCALP

## 2021-10-27 ASSESSMENT — LOCATION ZONE DERM
LOCATION ZONE: SCALP
LOCATION ZONE: HAND

## 2021-10-27 ASSESSMENT — LOCATION SIMPLE DESCRIPTION DERM
LOCATION SIMPLE: RIGHT HAND
LOCATION SIMPLE: RIGHT SCALP
LOCATION SIMPLE: LEFT HAND

## 2021-10-27 NOTE — PROCEDURE: MEDICATION COUNSELING
Oxybutynin Counseling:  I discussed with the patient the risks of oxybutynin including but not limited to skin rash, drowsiness, dry mouth, difficulty urinating, and blurred vision.
Use Enhanced Medication Counseling?: No
Bexarotene Counseling:  I discussed with the patient the risks of bexarotene including but not limited to hair loss, dry lips/skin/eyes, liver abnormalities, hyperlipidemia, pancreatitis, depression/suicidal ideation, photosensitivity, drug rash/allergic reactions, hypothyroidism, anemia, leukopenia, infection, cataracts, and teratogenicity.  Patient understands that they will need regular blood tests to check lipid profile, liver function tests, white blood cell count, thyroid function tests and pregnancy test if applicable.
Zyclara Pregnancy And Lactation Text: This medication is Pregnancy Category C. It is unknown if this medication is excreted in breast milk.
Calcipotriene Pregnancy And Lactation Text: This medication has not been proven safe during pregnancy. It is unknown if this medication is excreted in breast milk.
Mirvaso Pregnancy And Lactation Text: This medication has not been assigned a Pregnancy Risk Category. It is unknown if the medication is excreted in breast milk.
Gabapentin Counseling: I discussed with the patient the risks of gabapentin including but not limited to dizziness, somnolence, fatigue and ataxia.
Griseofulvin Counseling:  I discussed with the patient the risks of griseofulvin including but not limited to photosensitivity, cytopenia, liver damage, nausea/vomiting and severe allergy.  The patient understands that this medication is best absorbed when taken with a fatty meal (e.g., ice cream or french fries).
Humira Counseling:  I discussed with the patient the risks of adalimumab including but not limited to myelosuppression, immunosuppression, autoimmune hepatitis, demyelinating diseases, lymphoma, and serious infections.  The patient understands that monitoring is required including a PPD at baseline and must alert us or the primary physician if symptoms of infection or other concerning signs are noted.
Azathioprine Pregnancy And Lactation Text: This medication is Pregnancy Category D and isn't considered safe during pregnancy. It is unknown if this medication is excreted in breast milk.
Metronidazole Counseling:  I discussed with the patient the risks of metronidazole including but not limited to seizures, nausea/vomiting, a metallic taste in the mouth, nausea/vomiting and severe allergy.
Picato Counseling:  I discussed with the patient the risks of Picato including but not limited to erythema, scaling, itching, weeping, crusting, and pain.
Albendazole Counseling:  I discussed with the patient the risks of albendazole including but not limited to cytopenia, kidney damage, nausea/vomiting and severe allergy.  The patient understands that this medication is being used in an off-label manner.
Cellcept Counseling:  I discussed with the patient the risks of mycophenolate mofetil including but not limited to infection/immunosuppression, GI upset, hypokalemia, hypercholesterolemia, bone marrow suppression, lymphoproliferative disorders, malignancy, GI ulceration/bleed/perforation, colitis, interstitial lung disease, kidney failure, progressive multifocal leukoencephalopathy, and birth defects.  The patient understands that monitoring is required including a baseline creatinine and regular CBC testing. In addition, patient must alert us immediately if symptoms of infection or other concerning signs are noted.
Gabapentin Pregnancy And Lactation Text: This medication is Pregnancy Category C and isn't considered safe during pregnancy. It is excreted in breast milk.
Bexarotene Pregnancy And Lactation Text: This medication is Pregnancy Category X and should not be given to women who are pregnant or may become pregnant. This medication should not be used if you are breast feeding.
Oxybutynin Pregnancy And Lactation Text: This medication is Pregnancy Category B and is considered safe during pregnancy. It is unknown if it is excreted in breast milk.
Griseofulvin Pregnancy And Lactation Text: This medication is Pregnancy Category X and is known to cause serious birth defects. It is unknown if this medication is excreted in breast milk but breast feeding should be avoided.
Opioid Counseling: I discussed with the patient the potential side effects of opioids including but not limited to addiction, altered mental status, and depression. I stressed avoiding alcohol, benzodiazepines, muscle relaxants and sleep aids unless specifically okayed by a physician. The patient verbalized understanding of the proper use and possible adverse effects of opioids. All of the patient's questions and concerns were addressed. They were instructed to flush the remaining pills down the toilet if they did not need them for pain.
Humira Pregnancy And Lactation Text: This medication is Pregnancy Category B and is considered safe during pregnancy. It is unknown if this medication is excreted in breast milk.
Metronidazole Pregnancy And Lactation Text: This medication is Pregnancy Category B and considered safe during pregnancy.  It is also excreted in breast milk.
5-Fu Counseling: 5-Fluorouracil Counseling:  I discussed with the patient the risks of 5-fluorouracil including but not limited to erythema, scaling, itching, weeping, crusting, and pain.
Propranolol Counseling:  I discussed with the patient the risks of propranolol including but not limited to low heart rate, low blood pressure, low blood sugar, restlessness and increased cold sensitivity. They should call the office if they experience any of these side effects.
Albendazole Pregnancy And Lactation Text: This medication is Pregnancy Category C and it isn't known if it is safe during pregnancy. It is also excreted in breast milk.
5-Fu Pregnancy And Lactation Text: This medication is Pregnancy Category X and contraindicated in pregnancy and in women who may become pregnant. It is unknown if this medication is excreted in breast milk.
Itraconazole Counseling:  I discussed with the patient the risks of itraconazole including but not limited to liver damage, nausea/vomiting, neuropathy, and severe allergy.  The patient understands that this medication is best absorbed when taken with acidic beverages such as non-diet cola or ginger ale.  The patient understands that monitoring is required including baseline LFTs and repeat LFTs at intervals.  The patient understands that they are to contact us or the primary physician if concerning signs are noted.
Ilumya Counseling: I discussed with the patient the risks of tildrakizumab including but not limited to immunosuppression, malignancy, posterior leukoencephalopathy syndrome, and serious infections.  The patient understands that monitoring is required including a PPD at baseline and must alert us or the primary physician if symptoms of infection or other concerning signs are noted.
Isotretinoin Counseling: Patient should get monthly blood tests, not donate blood, not drive at night if vision affected, not share medication, and not undergo elective surgery for 6 months after tx completed. Side effects reviewed, pt to contact office should one occur.
Glycopyrrolate Counseling:  I discussed with the patient the risks of glycopyrrolate including but not limited to skin rash, drowsiness, dry mouth, difficulty urinating, and blurred vision.
Opioid Pregnancy And Lactation Text: These medications can lead to premature delivery and should be avoided during pregnancy. These medications are also present in breast milk in small amounts.
Minocycline Counseling: Patient advised regarding possible photosensitivity and discoloration of the teeth, skin, lips, tongue and gums.  Patient instructed to avoid sunlight, if possible.  When exposed to sunlight, patients should wear protective clothing, sunglasses, and sunscreen.  The patient was instructed to call the office immediately if the following severe adverse effects occur:  hearing changes, easy bruising/bleeding, severe headache, or vision changes.  The patient verbalized understanding of the proper use and possible adverse effects of minocycline.  All of the patient's questions and concerns were addressed.
Ivermectin Counseling:  Patient instructed to take medication on an empty stomach with a full glass of water.  Patient informed of potential adverse effects including but not limited to nausea, diarrhea, dizziness, itching, and swelling of the extremities or lymph nodes.  The patient verbalized understanding of the proper use and possible adverse effects of ivermectin.  All of the patient's questions and concerns were addressed.
Protopic Counseling: Patient may experience a mild burning sensation during topical application. Protopic is not approved in children less than 2 years of age. There have been case reports of hematologic and skin malignancies in patients using topical calcineurin inhibitors although causality is questionable.
Glycopyrrolate Pregnancy And Lactation Text: This medication is Pregnancy Category B and is considered safe during pregnancy. It is unknown if it is excreted breast milk.
Propranolol Pregnancy And Lactation Text: This medication is Pregnancy Category C and it isn't known if it is safe during pregnancy. It is excreted in breast milk.
Drysol Counseling:  I discussed with the patient the risks of drysol/aluminum chloride including but not limited to skin rash, itching, irritation, burning.
Minocycline Pregnancy And Lactation Text: This medication is Pregnancy Category D and not consider safe during pregnancy. It is also excreted in breast milk.
Itraconazole Pregnancy And Lactation Text: This medication is Pregnancy Category C and it isn't know if it is safe during pregnancy. It is also excreted in breast milk.
Cyclophosphamide Counseling:  I discussed with the patient the risks of cyclophosphamide including but not limited to hair loss, hormonal abnormalities, decreased fertility, abdominal pain, diarrhea, nausea and vomiting, bone marrow suppression and infection. The patient understands that monitoring is required while taking this medication.
Azithromycin Counseling:  I discussed with the patient the risks of azithromycin including but not limited to GI upset, allergic reaction, drug rash, diarrhea, and yeast infections.
Isotretinoin Pregnancy And Lactation Text: This medication is Pregnancy Category X and is considered extremely dangerous during pregnancy. It is unknown if it is excreted in breast milk.
Ilumya Pregnancy And Lactation Text: The risk during pregnancy and breastfeeding is uncertain with this medication.
Birth Control Pills Counseling: Birth Control Pill Counseling: I discussed with the patient the potential side effects of OCPs including but not limited to increased risk of stroke, heart attack, thrombophlebitis, deep venous thrombosis, hepatic adenomas, breast changes, GI upset, headaches, and depression.  The patient verbalized understanding of the proper use and possible adverse effects of OCPs. All of the patient's questions and concerns were addressed.
Protopic Pregnancy And Lactation Text: This medication is Pregnancy Category C. It is unknown if this medication is excreted in breast milk when applied topically.
Drysol Pregnancy And Lactation Text: This medication is considered safe during pregnancy and breast feeding.
Hydroxychloroquine Counseling:  I discussed with the patient that a baseline ophthalmologic exam is needed at the start of therapy and every year thereafter while on therapy. A CBC may also be warranted for monitoring.  The side effects of this medication were discussed with the patient, including but not limited to agranulocytosis, aplastic anemia, seizures, rashes, retinopathy, and liver toxicity. Patient instructed to call the office should any adverse effect occur.  The patient verbalized understanding of the proper use and possible adverse effects of Plaquenil.  All the patient's questions and concerns were addressed.
Arava Counseling:  Patient counseled regarding adverse effects of Arava including but not limited to nausea, vomiting, abnormalities in liver function tests. Patients may develop mouth sores, rash, diarrhea, and abnormalities in blood counts. The patient understands that monitoring is required including LFTs and blood counts.  There is a rare possibility of scarring of the liver and lung problems that can occur when taking methotrexate. Persistent nausea, loss of appetite, pale stools, dark urine, cough, and shortness of breath should be reported immediately. Patient advised to discontinue Arava treatment and consult with a physician prior to attempting conception. The patient will have to undergo a treatment to eliminate Arava from the body prior to conception.
High Dose Vitamin A Counseling: Side effects reviewed, pt to contact office should one occur.
Ketoconazole Counseling:   Patient counseled regarding improving absorption with orange juice.  Adverse effects include but are not limited to breast enlargement, headache, diarrhea, nausea, upset stomach, liver function test abnormalities, taste disturbance, and stomach pain.  There is a rare possibility of liver failure that can occur when taking ketoconazole. The patient understands that monitoring of LFTs may be required, especially at baseline. The patient verbalized understanding of the proper use and possible adverse effects of ketoconazole.  All of the patient's questions and concerns were addressed.
Infliximab Counseling:  I discussed with the patient the risks of infliximab including but not limited to myelosuppression, immunosuppression, autoimmune hepatitis, demyelinating diseases, lymphoma, and serious infections.  The patient understands that monitoring is required including a PPD at baseline and must alert us or the primary physician if symptoms of infection or other concerning signs are noted.
Azithromycin Pregnancy And Lactation Text: This medication is considered safe during pregnancy and is also secreted in breast milk.
Quinolones Counseling:  I discussed with the patient the risks of fluoroquinolones including but not limited to GI upset, allergic reaction, drug rash, diarrhea, dizziness, photosensitivity, yeast infections, liver function test abnormalities, tendonitis/tendon rupture.
Birth Control Pills Pregnancy And Lactation Text: This medication should be avoided if pregnant and for the first 30 days post-partum.
Cyclophosphamide Pregnancy And Lactation Text: This medication is Pregnancy Category D and it isn't considered safe during pregnancy. This medication is excreted in breast milk.
Hydroxychloroquine Pregnancy And Lactation Text: This medication has been shown to cause fetal harm but it isn't assigned a Pregnancy Risk Category. There are small amounts excreted in breast milk.
Rhofade Counseling: Rhofade is a topical medication which can decrease superficial blood flow where applied. Side effects are uncommon and include stinging, redness and allergic reactions.
Elidel Counseling: Patient may experience a mild burning sensation during topical application. Elidel is not approved in children less than 2 years of age. There have been case reports of hematologic and skin malignancies in patients using topical calcineurin inhibitors although causality is questionable.
Taltz Counseling: I discussed with the patient the risks of ixekizumab including but not limited to immunosuppression, serious infections, worsening of inflammatory bowel disease and drug reactions.  The patient understands that monitoring is required including a PPD at baseline and must alert us or the primary physician if symptoms of infection or other concerning signs are noted.
Ketoconazole Pregnancy And Lactation Text: This medication is Pregnancy Category C and it isn't know if it is safe during pregnancy. It is also excreted in breast milk and breast feeding isn't recommended.
Cyclosporine Counseling:  I discussed with the patient the risks of cyclosporine including but not limited to hypertension, gingival hyperplasia,myelosuppression, immunosuppression, liver damage, kidney damage, neurotoxicity, lymphoma, and serious infections. The patient understands that monitoring is required including baseline blood pressure, CBC, CMP, lipid panel and uric acid, and then 1-2 times monthly CMP and blood pressure.
High Dose Vitamin A Pregnancy And Lactation Text: High dose vitamin A therapy is contraindicated during pregnancy and breast feeding.
Arava Pregnancy And Lactation Text: This medication is Pregnancy Category X and is absolutely contraindicated during pregnancy. It is unknown if it is excreted in breast milk.
Libtayo Counseling- I discussed with the patient the risks of Libtayo including but not limited to nausea, vomiting, diarrhea, and bone or muscle pain.  The patient verbalized understanding of the proper use and possible adverse effects of Libtayo.  All of the patient's questions and concerns were addressed.
Spironolactone Counseling: Patient advised regarding risks of diarrhea, abdominal pain, hyperkalemia, birth defects (for female patients), liver toxicity and renal toxicity. The patient may need blood work to monitor liver and kidney function and potassium levels while on therapy. The patient verbalized understanding of the proper use and possible adverse effects of spironolactone.  All of the patient's questions and concerns were addressed.
Bactrim Counseling:  I discussed with the patient the risks of sulfa antibiotics including but not limited to GI upset, allergic reaction, drug rash, diarrhea, dizziness, photosensitivity, and yeast infections.  Rarely, more serious reactions can occur including but not limited to aplastic anemia, agranulocytosis, methemoglobinemia, blood dyscrasias, liver or kidney failure, lung infiltrates or desquamative/blistering drug rashes.
Clofazimine Counseling:  I discussed with the patient the risks of clofazimine including but not limited to skin and eye pigmentation, liver damage, nausea/vomiting, gastrointestinal bleeding and allergy.
Detail Level: Detailed
Rituxan Counseling:  I discussed with the patient the risks of Rituxan infusions. Side effects can include infusion reactions, severe drug rashes including mucocutaneous reactions, reactivation of latent hepatitis and other infections and rarely progressive multifocal leukoencephalopathy.  All of the patient's questions and concerns were addressed.
Solaraze Counseling:  I discussed with the patient the risks of Solaraze including but not limited to erythema, scaling, itching, weeping, crusting, and pain.
Rifampin Counseling: I discussed with the patient the risks of rifampin including but not limited to liver damage, kidney damage, red-orange body fluids, nausea/vomiting and severe allergy.
Terbinafine Counseling: Patient counseling regarding adverse effects of terbinafine including but not limited to headache, diarrhea, rash, upset stomach, liver function test abnormalities, itching, taste/smell disturbance, nausea, abdominal pain, and flatulence.  There is a rare possibility of liver failure that can occur when taking terbinafine.  The patient understands that a baseline LFT and kidney function test may be required. The patient verbalized understanding of the proper use and possible adverse effects of terbinafine.  All of the patient's questions and concerns were addressed.
Cyclosporine Pregnancy And Lactation Text: This medication is Pregnancy Category C and it isn't know if it is safe during pregnancy. This medication is excreted in breast milk.
Bactrim Pregnancy And Lactation Text: This medication is Pregnancy Category D and is known to cause fetal risk.  It is also excreted in breast milk.
Spironolactone Pregnancy And Lactation Text: This medication can cause feminization of the male fetus and should be avoided during pregnancy. The active metabolite is also found in breast milk.
Libtayo Pregnancy And Lactation Text: This medication is contraindicated in pregnancy and when breast feeding.
Eucrisa Counseling: Patient may experience a mild burning sensation during topical application. Eucrisa is not approved in children less than 2 years of age.
Terbinafine Pregnancy And Lactation Text: This medication is Pregnancy Category B and is considered safe during pregnancy. It is also excreted in breast milk and breast feeding isn't recommended.
Cimzia Counseling:  I discussed with the patient the risks of Cimzia including but not limited to immunosuppression, allergic reactions and infections.  The patient understands that monitoring is required including a PPD at baseline and must alert us or the primary physician if symptoms of infection or other concerning signs are noted.
Solaraze Pregnancy And Lactation Text: This medication is Pregnancy Category B and is considered safe. There is some data to suggest avoiding during the third trimester. It is unknown if this medication is excreted in breast milk.
Rituxan Pregnancy And Lactation Text: This medication is Pregnancy Category C and it isn't know if it is safe during pregnancy. It is unknown if this medication is excreted in breast milk but similar antibodies are known to be excreted.
Tremfya Counseling: I discussed with the patient the risks of guselkumab including but not limited to immunosuppression, serious infections, worsening of inflammatory bowel disease and drug reactions.  The patient understands that monitoring is required including a PPD at baseline and must alert us or the primary physician if symptoms of infection or other concerning signs are noted.
Rifampin Pregnancy And Lactation Text: This medication is Pregnancy Category C and it isn't know if it is safe during pregnancy. It is also excreted in breast milk and should not be used if you are breast feeding.
Cephalexin Counseling: I counseled the patient regarding use of cephalexin as an antibiotic for prophylactic and/or therapeutic purposes. Cephalexin (commonly prescribed under brand name Keflex) is a cephalosporin antibiotic which is active against numerous classes of bacteria, including most skin bacteria. Side effects may include nausea, diarrhea, gastrointestinal upset, rash, hives, yeast infections, and in rare cases, hepatitis, kidney disease, seizures, fever, confusion, neurologic symptoms, and others. Patients with severe allergies to penicillin medications are cautioned that there is about a 10% incidence of cross-reactivity with cephalosporins. When possible, patients with penicillin allergies should use alternatives to cephalosporins for antibiotic therapy.
SSKI Counseling:  I discussed with the patient the risks of SSKI including but not limited to thyroid abnormalities, metallic taste, GI upset, fever, headache, acne, arthralgias, paraesthesias, lymphadenopathy, easy bleeding, arrhythmias, and allergic reaction.
Niacinamide Counseling: I recommended taking niacin or niacinamide, also know as vitamin B3, twice daily. Recent evidence suggests that taking vitamin B3 (500 mg twice daily) can reduce the risk of actinic keratoses and non-melanoma skin cancers. Side effects of vitamin B3 include flushing and headache.
Methotrexate Counseling:  Patient counseled regarding adverse effects of methotrexate including but not limited to nausea, vomiting, abnormalities in liver function tests. Patients may develop mouth sores, rash, diarrhea, and abnormalities in blood counts. The patient understands that monitoring is required including LFT's and blood counts.  There is a rare possibility of scarring of the liver and lung problems that can occur when taking methotrexate. Persistent nausea, loss of appetite, pale stools, dark urine, cough, and shortness of breath should be reported immediately. Patient advised to discontinue methotrexate treatment at least three months before attempting to become pregnant.  I discussed the need for folate supplements while taking methotrexate.  These supplements can decrease side effects during methotrexate treatment. The patient verbalized understanding of the proper use and possible adverse effects of methotrexate.  All of the patient's questions and concerns were addressed.
Colchicine Counseling:  Patient counseled regarding adverse effects including but not limited to stomach upset (nausea, vomiting, stomach pain, or diarrhea).  Patient instructed to limit alcohol consumption while taking this medication.  Colchicine may reduce blood counts especially with prolonged use.  The patient understands that monitoring of kidney function and blood counts may be required, especially at baseline. The patient verbalized understanding of the proper use and possible adverse effects of colchicine.  All of the patient's questions and concerns were addressed.
Eucrisa Pregnancy And Lactation Text: This medication has not been assigned a Pregnancy Risk Category but animal studies failed to show danger with the topical medication. It is unknown if the medication is excreted in breast milk.
Sarecycline Counseling: Patient advised regarding possible photosensitivity and discoloration of the teeth, skin, lips, tongue and gums.  Patient instructed to avoid sunlight, if possible.  When exposed to sunlight, patients should wear protective clothing, sunglasses, and sunscreen.  The patient was instructed to call the office immediately if the following severe adverse effects occur:  hearing changes, easy bruising/bleeding, severe headache, or vision changes.  The patient verbalized understanding of the proper use and possible adverse effects of sarecycline.  All of the patient's questions and concerns were addressed.
Topical Retinoid counseling:  Patient advised to apply a pea-sized amount only at bedtime and wait 30 minutes after washing their face before applying.  If too drying, patient may add a non-comedogenic moisturizer. The patient verbalized understanding of the proper use and possible adverse effects of retinoids.  All of the patient's questions and concerns were addressed.
Niacinamide Pregnancy And Lactation Text: These medications are considered safe during pregnancy.
Sski Pregnancy And Lactation Text: This medication is Pregnancy Category D and isn't considered safe during pregnancy. It is excreted in breast milk.
Hydroquinone Counseling:  Patient advised that medication may result in skin irritation, lightening (hypopigmentation), dryness, and burning.  In the event of skin irritation, the patient was advised to reduce the amount of the drug applied or use it less frequently.  Rarely, spots that are treated with hydroquinone can become darker (pseudoochronosis).  Should this occur, patient instructed to stop medication and call the office. The patient verbalized understanding of the proper use and possible adverse effects of hydroquinone.  All of the patient's questions and concerns were addressed.
Cimzia Pregnancy And Lactation Text: This medication crosses the placenta but can be considered safe in certain situations. Cimzia may be excreted in breast milk.
Siliq Counseling:  I discussed with the patient the risks of Siliq including but not limited to new or worsening depression, suicidal thoughts and behavior, immunosuppression, malignancy, posterior leukoencephalopathy syndrome, and serious infections.  The patient understands that monitoring is required including a PPD at baseline and must alert us or the primary physician if symptoms of infection or other concerning signs are noted. There is also a special program designed to monitor depression which is required with Siliq.
Cephalexin Pregnancy And Lactation Text: This medication is Pregnancy Category B and considered safe during pregnancy.  It is also excreted in breast milk but can be used safely for shorter doses.
Methotrexate Pregnancy And Lactation Text: This medication is Pregnancy Category X and is known to cause fetal harm. This medication is excreted in breast milk.
Thalidomide Counseling: I discussed with the patient the risks of thalidomide including but not limited to birth defects, anxiety, weakness, chest pain, dizziness, cough and severe allergy.
Cimetidine Counseling:  I discussed with the patient the risks of Cimetidine including but not limited to gynecomastia, headache, diarrhea, nausea, drowsiness, arrhythmias, pancreatitis, skin rashes, psychosis, bone marrow suppression and kidney toxicity.
Cosentyx Counseling:  I discussed with the patient the risks of Cosentyx including but not limited to worsening of Crohn's disease, immunosuppression, allergic reactions and infections.  The patient understands that monitoring is required including a PPD at baseline and must alert us or the primary physician if symptoms of infection or other concerning signs are noted.
Xeljanz Counseling: I discussed with the patient the risks of Xeljanz therapy including increased risk of infection, liver issues, headache, diarrhea, or cold symptoms. Live vaccines should be avoided. They were instructed to call if they have any problems.
Clindamycin Counseling: I counseled the patient regarding use of clindamycin as an antibiotic for prophylactic and/or therapeutic purposes. Clindamycin is active against numerous classes of bacteria, including skin bacteria. Side effects may include nausea, diarrhea, gastrointestinal upset, rash, hives, yeast infections, and in rare cases, colitis.
Nsaids Counseling: NSAID Counseling: I discussed with the patient that NSAIDs should be taken with food. Prolonged use of NSAIDs can result in the development of stomach ulcers.  Patient advised to stop taking NSAIDs if abdominal pain occurs.  The patient verbalized understanding of the proper use and possible adverse effects of NSAIDs.  All of the patient's questions and concerns were addressed.
Dapsone Counseling: I discussed with the patient the risks of dapsone including but not limited to hemolytic anemia, agranulocytosis, rashes, methemoglobinemia, kidney failure, peripheral neuropathy, headaches, GI upset, and liver toxicity.  Patients who start dapsone require monitoring including baseline LFTs and weekly CBCs for the first month, then every month thereafter.  The patient verbalized understanding of the proper use and possible adverse effects of dapsone.  All of the patient's questions and concerns were addressed.
Xelsergioz Pregnancy And Lactation Text: This medication is Pregnancy Category D and is not considered safe during pregnancy.  The risk during breast feeding is also uncertain.
Prednisone Counseling:  I discussed with the patient the risks of prolonged use of prednisone including but not limited to weight gain, insomnia, osteoporosis, mood changes, diabetes, susceptibility to infection, glaucoma and high blood pressure.  In cases where prednisone use is prolonged, patients should be monitored with blood pressure checks, serum glucose levels and an eye exam.  Additionally, the patient may need to be placed on GI prophylaxis, PCP prophylaxis, and calcium and vitamin D supplementation and/or a bisphosphonate.  The patient verbalized understanding of the proper use and the possible adverse effects of prednisone.  All of the patient's questions and concerns were addressed.
Tetracycline Counseling: Patient counseled regarding possible photosensitivity and increased risk for sunburn.  Patient instructed to avoid sunlight, if possible.  When exposed to sunlight, patients should wear protective clothing, sunglasses, and sunscreen.  The patient was instructed to call the office immediately if the following severe adverse effects occur:  hearing changes, easy bruising/bleeding, severe headache, or vision changes.  The patient verbalized understanding of the proper use and possible adverse effects of tetracycline.  All of the patient's questions and concerns were addressed. Patient understands to avoid pregnancy while on therapy due to potential birth defects.
Topical Sulfur Applications Counseling: Topical Sulfur Counseling: Patient counseled that this medication may cause skin irritation or allergic reactions.  In the event of skin irritation, the patient was advised to reduce the amount of the drug applied or use it less frequently.   The patient verbalized understanding of the proper use and possible adverse effects of topical sulfur application.  All of the patient's questions and concerns were addressed.
Nsaids Pregnancy And Lactation Text: These medications are considered safe up to 30 weeks gestation. It is excreted in breast milk.
Tazorac Counseling:  Patient advised that medication is irritating and drying.  Patient may need to apply sparingly and wash off after an hour before eventually leaving it on overnight.  The patient verbalized understanding of the proper use and possible adverse effects of tazorac.  All of the patient's questions and concerns were addressed.
Benzoyl Peroxide Counseling: Patient counseled that medicine may cause skin irritation and bleach clothing.  In the event of skin irritation, the patient was advised to reduce the amount of the drug applied or use it less frequently.   The patient verbalized understanding of the proper use and possible adverse effects of benzoyl peroxide.  All of the patient's questions and concerns were addressed.
Simponi Counseling:  I discussed with the patient the risks of golimumab including but not limited to myelosuppression, immunosuppression, autoimmune hepatitis, demyelinating diseases, lymphoma, and serious infections.  The patient understands that monitoring is required including a PPD at baseline and must alert us or the primary physician if symptoms of infection or other concerning signs are noted.
Imiquimod Counseling:  I discussed with the patient the risks of imiquimod including but not limited to erythema, scaling, itching, weeping, crusting, and pain.  Patient understands that the inflammatory response to imiquimod is variable from person to person and was educated regarded proper titration schedule.  If flu-like symptoms develop, patient knows to discontinue the medication and contact us.
Clindamycin Pregnancy And Lactation Text: This medication can be used in pregnancy if certain situations. Clindamycin is also present in breast milk.
Dapsone Pregnancy And Lactation Text: This medication is Pregnancy Category C and is not considered safe during pregnancy or breast feeding.
Doxepin Counseling:  Patient advised that the medication is sedating and not to drive a car after taking this medication. Patient informed of potential adverse effects including but not limited to dry mouth, urinary retention, and blurry vision.  The patient verbalized understanding of the proper use and possible adverse effects of doxepin.  All of the patient's questions and concerns were addressed.
Tazorac Pregnancy And Lactation Text: This medication is not safe during pregnancy. It is unknown if this medication is excreted in breast milk.
Tranexamic Acid Counseling:  Patient advised of the small risk of bleeding problems with tranexamic acid. They were also instructed to call if they developed any nausea, vomiting or diarrhea. All of the patient's questions and concerns were addressed.
Topical Sulfur Applications Pregnancy And Lactation Text: This medication is Pregnancy Category C and has an unknown safety profile during pregnancy. It is unknown if this topical medication is excreted in breast milk.
Xolair Counseling:  Patient informed of potential adverse effects including but not limited to fever, muscle aches, rash and allergic reactions.  The patient verbalized understanding of the proper use and possible adverse effects of Xolair.  All of the patient's questions and concerns were addressed.
Dupixent Counseling: I discussed with the patient the risks of dupilumab including but not limited to eye infection and irritation, cold sores, injection site reactions, worsening of asthma, allergic reactions and increased risk of parasitic infection.  Live vaccines should be avoided while taking dupilumab. Dupilumab will also interact with certain medications such as warfarin and cyclosporine. The patient understands that monitoring is required and they must alert us or the primary physician if symptoms of infection or other concerning signs are noted.
Doxycycline Counseling:  Patient counseled regarding possible photosensitivity and increased risk for sunburn.  Patient instructed to avoid sunlight, if possible.  When exposed to sunlight, patients should wear protective clothing, sunglasses, and sunscreen.  The patient was instructed to call the office immediately if the following severe adverse effects occur:  hearing changes, easy bruising/bleeding, severe headache, or vision changes.  The patient verbalized understanding of the proper use and possible adverse effects of doxycycline.  All of the patient's questions and concerns were addressed.
Erivedge Counseling- I discussed with the patient the risks of Erivedge including but not limited to nausea, vomiting, diarrhea, constipation, weight loss, changes in the sense of taste, decreased appetite, muscle spasms, and hair loss.  The patient verbalized understanding of the proper use and possible adverse effects of Erivedge.  All of the patient's questions and concerns were addressed.
Odomzo Counseling- I discussed with the patient the risks of Odomzo including but not limited to nausea, vomiting, diarrhea, constipation, weight loss, changes in the sense of taste, decreased appetite, muscle spasms, and hair loss.  The patient verbalized understanding of the proper use and possible adverse effects of Odomzo.  All of the patient's questions and concerns were addressed.
Benzoyl Peroxide Pregnancy And Lactation Text: This medication is Pregnancy Category C. It is unknown if benzoyl peroxide is excreted in breast milk.
Wartpeel Counseling:  I discussed with the patient the risks of Wartpeel including but not limited to erythema, scaling, itching, weeping, crusting, and pain.
Carac Counseling:  I discussed with the patient the risks of Carac including but not limited to erythema, scaling, itching, weeping, crusting, and pain.
Tranexamic Acid Pregnancy And Lactation Text: It is unknown if this medication is safe during pregnancy or breast feeding.
Doxepin Pregnancy And Lactation Text: This medication is Pregnancy Category C and it isn't known if it is safe during pregnancy. It is also excreted in breast milk and breast feeding isn't recommended.
Minoxidil Counseling: Minoxidil is a topical medication which can increase blood flow where it is applied. It is uncertain how this medication increases hair growth. Side effects are uncommon and include stinging and allergic reactions.
Topical Clindamycin Counseling: Patient counseled that this medication may cause skin irritation or allergic reactions.  In the event of skin irritation, the patient was advised to reduce the amount of the drug applied or use it less frequently.   The patient verbalized understanding of the proper use and possible adverse effects of clindamycin.  All of the patient's questions and concerns were addressed.
Skyrizi Counseling: I discussed with the patient the risks of risankizumab-rzaa including but not limited to immunosuppression, and serious infections.  The patient understands that monitoring is required including a PPD at baseline and must alert us or the primary physician if symptoms of infection or other concerning signs are noted.
Dupixent Pregnancy And Lactation Text: This medication likely crosses the placenta but the risk for the fetus is uncertain. This medication is excreted in breast milk.
Xolair Pregnancy And Lactation Text: This medication is Pregnancy Category B and is considered safe during pregnancy. This medication is excreted in breast milk.
Doxycycline Pregnancy And Lactation Text: This medication is Pregnancy Category D and not consider safe during pregnancy. It is also excreted in breast milk but is considered safe for shorter treatment courses.
Hydroxyzine Counseling: Patient advised that the medication is sedating and not to drive a car after taking this medication.  Patient informed of potential adverse effects including but not limited to dry mouth, urinary retention, and blurry vision.  The patient verbalized understanding of the proper use and possible adverse effects of hydroxyzine.  All of the patient's questions and concerns were addressed.
Acitretin Counseling:  I discussed with the patient the risks of acitretin including but not limited to hair loss, dry lips/skin/eyes, liver damage, hyperlipidemia, depression/suicidal ideation, photosensitivity.  Serious rare side effects can include but are not limited to pancreatitis, pseudotumor cerebri, bony changes, clot formation/stroke/heart attack.  Patient understands that alcohol is contraindicated since it can result in liver toxicity and significantly prolong the elimination of the drug by many years.
Valtrex Counseling: I discussed with the patient the risks of valacyclovir including but not limited to kidney damage, nausea, vomiting and severe allergy.  The patient understands that if the infection seems to be worsening or is not improving, they are to call.
Fluconazole Counseling:  Patient counseled regarding adverse effects of fluconazole including but not limited to headache, diarrhea, nausea, upset stomach, liver function test abnormalities, taste disturbance, and stomach pain.  There is a rare possibility of liver failure that can occur when taking fluconazole.  The patient understands that monitoring of LFTs and kidney function test may be required, especially at baseline. The patient verbalized understanding of the proper use and possible adverse effects of fluconazole.  All of the patient's questions and concerns were addressed.
Finasteride Male Counseling: Finasteride Counseling:  I discussed with the patient the risks of use of finasteride including but not limited to decreased libido, decreased ejaculate volume, gynecomastia, and depression. Women should not handle medication.  All of the patient's questions and concerns were addressed.
Otezla Counseling: The side effects of Otezla were discussed with the patient, including but not limited to worsening or new depression, weight loss, diarrhea, nausea, upper respiratory tract infection, and headache. Patient instructed to call the office should any adverse effect occur.  The patient verbalized understanding of the proper use and possible adverse effects of Otezla.  All the patient's questions and concerns were addressed.
Enbrel Counseling:  I discussed with the patient the risks of etanercept including but not limited to myelosuppression, immunosuppression, autoimmune hepatitis, demyelinating diseases, lymphoma, and infections.  The patient understands that monitoring is required including a PPD at baseline and must alert us or the primary physician if symptoms of infection or other concerning signs are noted.
Acitretin Pregnancy And Lactation Text: This medication is Pregnancy Category X and should not be given to women who are pregnant or may become pregnant in the future. This medication is excreted in breast milk.
Erythromycin Counseling:  I discussed with the patient the risks of erythromycin including but not limited to GI upset, allergic reaction, drug rash, diarrhea, increase in liver enzymes, and yeast infections.
Valtrex Pregnancy And Lactation Text: this medication is Pregnancy Category B and is considered safe during pregnancy. This medication is not directly found in breast milk but it's metabolite acyclovir is present.
Zyclara Counseling:  I discussed with the patient the risks of imiquimod including but not limited to erythema, scaling, itching, weeping, crusting, and pain.  Patient understands that the inflammatory response to imiquimod is variable from person to person and was educated regarded proper titration schedule.  If flu-like symptoms develop, patient knows to discontinue the medication and contact us.
Hydroxyzine Pregnancy And Lactation Text: This medication is not safe during pregnancy and should not be taken. It is also excreted in breast milk and breast feeding isn't recommended.
Mirvaso Counseling: Mirvaso is a topical medication which can decrease superficial blood flow where applied. Side effects are uncommon and include stinging, redness and allergic reactions.
Calcipotriene Counseling:  I discussed with the patient the risks of calcipotriene including but not limited to erythema, scaling, itching, and irritation.
Azathioprine Counseling:  I discussed with the patient the risks of azathioprine including but not limited to myelosuppression, immunosuppression, hepatotoxicity, lymphoma, and infections.  The patient understands that monitoring is required including baseline LFTs, Creatinine, possible TPMP genotyping and weekly CBCs for the first month and then every 2 weeks thereafter.  The patient verbalized understanding of the proper use and possible adverse effects of azathioprine.  All of the patient's questions and concerns were addressed.
Otezla Pregnancy And Lactation Text: This medication is Pregnancy Category C and it isn't known if it is safe during pregnancy. It is unknown if it is excreted in breast milk.
Stelara Counseling:  I discussed with the patient the risks of ustekinumab including but not limited to immunosuppression, malignancy, posterior leukoencephalopathy syndrome, and serious infections.  The patient understands that monitoring is required including a PPD at baseline and must alert us or the primary physician if symptoms of infection or other concerning signs are noted.
Erythromycin Pregnancy And Lactation Text: This medication is Pregnancy Category B and is considered safe during pregnancy. It is also excreted in breast milk.
Finasteride Pregnancy And Lactation Text: This medication is absolutely contraindicated during pregnancy. It is unknown if it is excreted in breast milk.

## 2021-10-27 NOTE — HPI: SWEATING (HYPERHIDROSIS)
Sweating Severity Scale: 1- The sweating is never noticeable and never interferes with daily activities
How Severe Is It?: mild
Is This A New Presentation, Or A Follow-Up?: Sweating

## 2021-10-27 NOTE — PROCEDURE: ORDER TESTS
Performing Laboratory: 0
Billing Type: Third-Party Bill
Expected Date Of Service: 10/27/2021
Bill For Surgical Tray: no

## 2021-11-03 ENCOUNTER — HOSPITAL ENCOUNTER (OUTPATIENT)
Dept: LAB | Facility: MEDICAL CENTER | Age: 21
End: 2021-11-03
Attending: PEDIATRICS
Payer: COMMERCIAL

## 2021-11-03 ENCOUNTER — HOSPITAL ENCOUNTER (OUTPATIENT)
Dept: LAB | Facility: MEDICAL CENTER | Age: 21
End: 2021-11-03
Attending: DERMATOLOGY
Payer: COMMERCIAL

## 2021-11-03 DIAGNOSIS — Z85.6 HISTORY OF LEUKEMIA: ICD-10-CM

## 2021-11-03 LAB
25(OH)D3 SERPL-MCNC: 21 NG/ML (ref 30–100)
ALBUMIN SERPL BCP-MCNC: 5 G/DL (ref 3.2–4.9)
ALBUMIN/GLOB SERPL: 2.1 G/DL
ALP SERPL-CCNC: 73 U/L (ref 30–99)
ALT SERPL-CCNC: 13 U/L (ref 2–50)
ANION GAP SERPL CALC-SCNC: 14 MMOL/L (ref 7–16)
AST SERPL-CCNC: 13 U/L (ref 12–45)
BASOPHILS # BLD AUTO: 0.2 % (ref 0–1.8)
BASOPHILS # BLD: 0.01 K/UL (ref 0–0.12)
BILIRUB SERPL-MCNC: 0.8 MG/DL (ref 0.1–1.5)
BUN SERPL-MCNC: 13 MG/DL (ref 8–22)
CALCIUM SERPL-MCNC: 10 MG/DL (ref 8.5–10.5)
CHLORIDE SERPL-SCNC: 105 MMOL/L (ref 96–112)
CO2 SERPL-SCNC: 22 MMOL/L (ref 20–33)
CREAT SERPL-MCNC: 0.78 MG/DL (ref 0.5–1.4)
EOSINOPHIL # BLD AUTO: 0.07 K/UL (ref 0–0.51)
EOSINOPHIL NFR BLD: 1.2 % (ref 0–6.9)
ERYTHROCYTE [DISTWIDTH] IN BLOOD BY AUTOMATED COUNT: 38.5 FL (ref 35.9–50)
GLOBULIN SER CALC-MCNC: 2.4 G/DL (ref 1.9–3.5)
GLUCOSE SERPL-MCNC: 120 MG/DL (ref 65–99)
HCT VFR BLD AUTO: 45.6 % (ref 37–47)
HGB BLD-MCNC: 15.7 G/DL (ref 12–16)
IMM GRANULOCYTES # BLD AUTO: 0.02 K/UL (ref 0–0.11)
IMM GRANULOCYTES NFR BLD AUTO: 0.3 % (ref 0–0.9)
LYMPHOCYTES # BLD AUTO: 1.29 K/UL (ref 1–4.8)
LYMPHOCYTES NFR BLD: 22.1 % (ref 22–41)
MCH RBC QN AUTO: 32.3 PG (ref 27–33)
MCHC RBC AUTO-ENTMCNC: 34.4 G/DL (ref 33.6–35)
MCV RBC AUTO: 93.8 FL (ref 81.4–97.8)
MONOCYTES # BLD AUTO: 0.28 K/UL (ref 0–0.85)
MONOCYTES NFR BLD AUTO: 4.8 % (ref 0–13.4)
NEUTROPHILS # BLD AUTO: 4.16 K/UL (ref 2–7.15)
NEUTROPHILS NFR BLD: 71.4 % (ref 44–72)
NRBC # BLD AUTO: 0 K/UL
NRBC BLD-RTO: 0 /100 WBC
PLATELET # BLD AUTO: 240 K/UL (ref 164–446)
PMV BLD AUTO: 9.8 FL (ref 9–12.9)
POTASSIUM SERPL-SCNC: 3.7 MMOL/L (ref 3.6–5.5)
PROT SERPL-MCNC: 7.4 G/DL (ref 6–8.2)
RBC # BLD AUTO: 4.86 M/UL (ref 4.2–5.4)
SODIUM SERPL-SCNC: 141 MMOL/L (ref 135–145)
T4 FREE SERPL-MCNC: 1.4 NG/DL (ref 0.93–1.7)
TSH SERPL DL<=0.005 MIU/L-ACNC: 2.02 UIU/ML (ref 0.38–5.33)
WBC # BLD AUTO: 5.8 K/UL (ref 4.8–10.8)

## 2021-11-03 PROCEDURE — 86038 ANTINUCLEAR ANTIBODIES: CPT

## 2021-11-03 PROCEDURE — 80053 COMPREHEN METABOLIC PANEL: CPT

## 2021-11-03 PROCEDURE — 85025 COMPLETE CBC W/AUTO DIFF WBC: CPT

## 2021-11-03 PROCEDURE — 83520 IMMUNOASSAY QUANT NOS NONAB: CPT

## 2021-11-03 PROCEDURE — 84443 ASSAY THYROID STIM HORMONE: CPT

## 2021-11-03 PROCEDURE — 82306 VITAMIN D 25 HYDROXY: CPT

## 2021-11-03 PROCEDURE — 84439 ASSAY OF FREE THYROXINE: CPT

## 2021-11-03 PROCEDURE — 36415 COLL VENOUS BLD VENIPUNCTURE: CPT

## 2021-11-05 LAB
MIS SERPL-MCNC: 2.05 NG/ML (ref 0.4–16.02)
NUCLEAR IGG SER QL IA: NORMAL

## 2021-11-29 ENCOUNTER — OFFICE VISIT (OUTPATIENT)
Dept: PEDIATRIC HEMATOLOGY/ONCOLOGY | Facility: OUTPATIENT CENTER | Age: 21
End: 2021-11-29
Payer: COMMERCIAL

## 2021-11-29 VITALS
TEMPERATURE: 98.9 F | OXYGEN SATURATION: 99 % | WEIGHT: 182.54 LBS | HEART RATE: 99 BPM | BODY MASS INDEX: 28.65 KG/M2 | DIASTOLIC BLOOD PRESSURE: 96 MMHG | HEIGHT: 67 IN | SYSTOLIC BLOOD PRESSURE: 139 MMHG

## 2021-11-29 DIAGNOSIS — Z85.6 HISTORY OF LEUKEMIA: ICD-10-CM

## 2021-11-29 PROCEDURE — 99213 OFFICE O/P EST LOW 20 MIN: CPT | Performed by: PEDIATRICS

## 2021-11-29 ASSESSMENT — PATIENT HEALTH QUESTIONNAIRE - PHQ9: CLINICAL INTERPRETATION OF PHQ2 SCORE: 0

## 2021-11-29 ASSESSMENT — FIBROSIS 4 INDEX: FIB4 SCORE: 0.32

## 2021-11-29 NOTE — PROGRESS NOTES
Pediatric Hematology/Oncology Clinic  Progress Note      Patient Name:  Emy Francis  : 2000   MRN: [id number]    Location of Service: [facility]    Date of Service: 2021  Time: 2:23 PM    Primary Care Physician: Cadence Morales M.D.    HISTORY OF PRESENT ILLNESS:     Chief Complaint: ON STUDY Follow-up [id number](OS), 63 months (5 years and 3 months) off therapy     History of Present Illness: Emy Francis is a 20 y.o. 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  Emy presents by herself and provides accurate clinical and interval history.     Briefly, Emy was otherwise healthy until mid-2015 when she began to develop fatigue and a headache.  She also noticed at the time a bruise on the left side of her breast.  Over the next couple of weeks, the headaches worsened and she began to develop more symptoms including a constant bloody nose and light headedness.  She also began to develop petechiae on her legs, arms and shoulders.  The bruising worsened and she began to develop shortness of breath.  She was brought to her pediatrician's office with gum bleeding, lightheadedness and bloody urin on 11/30/15.  A routine set of labs were obtained, but before they had resulted, Emy was brought to the hospital with increasing shortness of breath.  Dr. Joseph saw her upon admission and informed her that her counts were all low and she would be transferred to [facility] for work-up and treatment.  She was transferred on 12/1/15 and a leukemia work-up was started.  A bone marrow evaluation at [facility] was remarkable for acute promyelocytic leukemia.  Her initial lumbar puncture was negative for CSF disease.  Flow cytometry was strongly positive for CD13 and CD33; mild positivity for HLA-DR and CD34.  FISH for t(15,17) was confirmed.  Pre-treatment with all-trans  retinoic acid (ATRA) was initiated prior to confirmatory results on 12/3/15.  Initial supportive care also included PRBC transfusion.  Work-up was complicated by what Emy describes as a spinal headache which lasted the entire first moth of treatment.  Following confirmatory results, Emy was consented for and enrolled on [facility] [id number]. Induction with arsenic trioxide and ATRA was started started 12/5/15 and completed 1/4/16.  Induction was not only complicated by a persistent headache, but Emy also experienced rising white blood cell count requiring the initiation of hydroxyurea and decadron on 12/15/15 and ending on 12/24/15 when WBC finally dropped below 10,000.  Persistent headaches forced ATRA to be held 12/25/15 and restarted on 12/26/15 at 75% dosing.  Although there was breif improvement, the following week, 1/4/16 Emy developed double vision and esotropia in her right eye.  Ophthalmologic examination was remarkable for papilledema and ATRA again was held.  Ultimately she would be treated with Diamox for pseudotumor cerebri resulting from ATRA therapy.  I  Induction was also complicated by coagulopathy mild coagulopathy, spinal headaches, nausea/vomitting, plantar palmar erythrodysesthesia, capillary leak syndrome, skin hypersensitivity and steroid intolerance. Post-Induction evaluation was unremarkable for residual disease.  Consolidation was scheduled for start of 1/18/16, but was delayed two weeks due to myelosuppression and started 2/1/16.  The remainder of Consolidation Cycle 1 was unremarkable and primarily therapy was given in [place].  Consolidation Cycle 2 was started as scheduled 3/28/16 and Emy's end of Cycle 2 bone marrow evaluation was unremarkable for any MRD (PML-RAR?).  Consolidation Cycles 3 and 4 were unremarkable and therapy was completed 8/17/16.  Emy was seen in follow-up for the first 5 months off therapy by Dr. Joseph in [place].  During this period of time she was  weaned from her Diamox and there have been no further complications of her therapy.    Her off therapy care was then transferred to [facility].  Follow-up reporting has continued to date for her study.     Today, Emy reports overall doing well. She is recovering from a recent episode of runny nose and cough. Denies fever, shortness of breath , difficulty breathing. She states that she has very good energy and has been quite active. She denies having any headaches, denies having any aches or pains in her hips or back.  She continues to attend classes in [employer] and wants to be either a genetic counselor or a . She reports having issues with memory and was inquiring about repeat neuro-cognitive testing at [place]. She finds it difficult to recollect and reproduce some of the previously learnt information during exam. Also, tends to forget things that her friends and Emy have already discussed.  No complaints of any changes in vision or neurologic status changes.  No concerns for learning disability.  No issues with behavior. Occasional alcohol use however no smoking and no drug use.  Is currently in a relationship and sexually active and uses IUD as birth control method.  She did not want us to discuss about weight issues today.     She is currently taking glycopyrrolate BID for excessive sweating in her palms/soles. Has a FU appointment scheduled with dermatology. Has already received 2 doses of COVID vaccine, planning to get booster injection soon.    Review of Systems:     Constitutional: Afebrile.  Without recent illness.  Energy and activity are good.   HENT: Negative.  Eyes: Negative for visual changes.  Respiratory: Negative for  shortness of breath.  Cardiovascular: Negative.  Gastrointestinal: Negative for nausea, vomiting, abdominal pain, diarrhea, constipation.    Genitourinary: Negative.  IUD in place.  Musculoskeletal: Negative.  Skin: Negative for rash, signs of  "infection.  Neurological: Negative for numbness, tingling, sensory changes, weakness or headaches.    Endo/Heme/Allergies: Does not bruise/bleed easily.     Psychiatric/Behavioral: No changes in mood, appropriate for age.     PAST MEDICAL HISTORY:       Past Medical History:     1) Previously health, only one episode of otitis media as a child  2) Acute Promyelocytic Leukemia (APML) diagnosis  3) No hospitalizations or surgeries prior to diagnosis of APML  4) Seasonal allergies  5) Obesity (RESOLVED)  6) Ovarian Cyst  7) Disordered Eating       Past Surgical History:      1) Therapy related bone marrow aspiration, lumbar punctures  2) Port-a-cath placement and removal     Birth/Developmental History:     1st of 2 children  No complications of pregnancy, good prenatal care  Delivered at 39 weeks EGA, no complications of delivery, however transferred to the NICU for temperature of 103F - stayed for 10 days  No further complications  Never any concerns for growth and development  Has always met milestones  Excels in school      Menstrual History:  No menses, IUD currently in place.     Allergies:         Allergies as of 08/15/2018   • (No Known Allergies)      Social History:   Lives at home with mother, father and younger brother.  All are healthy and well. Studying at [employer].     Family History:      1) Maternal grandfather with Stage IV gastric Ca.  2) Paternal grandfather with \"pre-kidney cancer\"  3) Cousin with lupus  4) Family history of HTN  5) No childhood cancers, no childhood unexpected deaths or illness, no rheumatologicc disease, bleeding or clotting disorders.     Immunizations:  Up to date.     Medications: Glycopyrrolate BID      OBJECTIVE:     Vitals:   /96 (BP Location: Right arm, Patient Position: Sitting, BP Cuff Size: Adult)   Pulse 99   Temp 37.2 °C (98.9 °F) (Temporal)   Ht 1.7 m (5' 6.93\")   Wt 82.8 kg (182 lb 8.7 oz)   SpO2 99%     Labs:    No visits with results within 2 Day(s) from " this visit.   Latest known visit with results is:   Hospital Outpatient Visit on 11/03/2021   Component Date Value   • Sodium 11/03/2021 141    • Potassium 11/03/2021 3.7    • Chloride 11/03/2021 105    • Co2 11/03/2021 22    • Anion Gap 11/03/2021 14.0    • Glucose 11/03/2021 120*   • Bun 11/03/2021 13    • Creatinine 11/03/2021 0.78    • Calcium 11/03/2021 10.0    • AST(SGOT) 11/03/2021 13    • ALT(SGPT) 11/03/2021 13    • Alkaline Phosphatase 11/03/2021 73    • Total Bilirubin 11/03/2021 0.8    • Albumin 11/03/2021 5.0*   • Total Protein 11/03/2021 7.4    • Globulin 11/03/2021 2.4    • A-G Ratio 11/03/2021 2.1    • WBC 11/03/2021 5.8    • RBC 11/03/2021 4.86    • Hemoglobin 11/03/2021 15.7    • Hematocrit 11/03/2021 45.6    • MCV 11/03/2021 93.8    • MCH 11/03/2021 32.3    • MCHC 11/03/2021 34.4    • RDW 11/03/2021 38.5    • Platelet Count 11/03/2021 240    • MPV 11/03/2021 9.8    • Neutrophils-Polys 11/03/2021 71.40    • Lymphocytes 11/03/2021 22.10    • Monocytes 11/03/2021 4.80    • Eosinophils 11/03/2021 1.20    • Basophils 11/03/2021 0.20    • Immature Granulocytes 11/03/2021 0.30    • Nucleated RBC 11/03/2021 0.00    • Neutrophils (Absolute) 11/03/2021 4.16    • Lymphs (Absolute) 11/03/2021 1.29    • Monos (Absolute) 11/03/2021 0.28    • Eos (Absolute) 11/03/2021 0.07    • Baso (Absolute) 11/03/2021 0.01    • Immature Granulocytes (a* 11/03/2021 0.02    • NRBC (Absolute) 11/03/2021 0.00    • Anti-Mullerian Hormone 11/03/2021 2.052    • 25-Hydroxy   Vitamin D 25 11/03/2021 21*   • TSH 11/03/2021 2.020    • Free T-4 11/03/2021 1.40    • GFR If  11/03/2021 >60    • GFR If Non  Ameri* 11/03/2021 >60        Physical Exam:    Constitutional: Well-developed, well-nourished, and in no distress.  Well appearing.  HENT: Normocephalic and atraumatic. No nasal congestion or rhinorrhea. Oropharynx is clear and moist. No oral ulcerations or sores.    Eyes: Conjunctivae are normal. Pupils are equal,  round, and reactive to light.    Neck: Normal range of motion of neck, no adenopathy.    Cardiovascular: Normal rate, regular rhythm and normal heart sounds.  No murmur heard. DP/radial pulses 2+, cap refill < 2 sec  Pulmonary/Chest: Effort normal and breath sounds normal. No respiratory distress. Symmetric expansion.  No crackles or wheezes.  Abdomen: Soft. Bowel sounds are normal. No distension and no mass. There is no hepatosplenomegaly.    Genitourinary:  Deferred  Musculoskeletal: Normal range of motion of lower and upper extremities bilaterally. No tenderness to palpation of elbows, wrists, hands, knees, ankles and feet bilaterally.   Lymphadenopathy: No cervical adenopathy, axillary adenopathy or inguinal adenopathy.   Neurological: Alert and oriented to person and place. Exhibits normal muscle tone bilaterally in upper and lower extremities. Gait normal. Coordination normal.    Skin: Skin is warm, dry and pink.  No rash or evidence of skin infection.  No pallor.   Psychiatric: Mood and affect normal for age.      ASSESSMENT AND PLAN:     Emy Francis is a 20 year old previously healthy female with a history of APML, treated on [id number] for  63 months (5 years and 3 months) off therapy follow-up.     1) Acute Promyelocytic Leukemia, History:              - Completed therapy ON STUDY [id number], now 63 months off therapy              - No clinical evidence of disease, no laboratory evidence of disease              - WBC 5.8 K/microliter, Hgb  15.7 gm/dL, platelets 255 K/microliters normal and reassuring (MCV remains elevated, however improved from previous visit on 11/2020 and trending down)              - No physical evidence of disease              - Will RTC 12 months - continue data submission for on study [id number](OS)     2) Headaches (RESOLVED):         3) History of Hypertension:              - Blood pressure elevated 139/96 mm Hg, however improved from prior visits here.              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     4) Disordered Eating:              - Previously obese at end of therapy, with significant symptoms related to obesity              - Starting in 2019 with rapid weight loss and elevated MCV concerning for disordered eating              - Findings were discussed with patient at that time              - Diagnosed with disordered eating, was on treatment              - By request, did not discuss problem today     5) Joint Pains (RESOLVED):            6) History of Vitamin D Deficiency:              - Vitamin D improved to 21 ng/mL              - Discussed bone health and encouraged vitamin D and calcium supplementation     7) Survivorship/Late Effects Monitoring:              - Cognitive:  Doing well in college. However, having issues with memory. Emy has neuro-cognitive testing done in [place] prior to the start of COVID pandemic. Patient will reach out to the clinic for repeat testing.              - Psychosocial:  No reports of behavioral issues.              - Renal:  Blood pressure slightly better than previous clinic visits. Creatinine 0.78 mg/dL (slightly up from before, however eGFR remains stable). Electrolytes normal.              - Cardiac:  ECHO yearly to 10 years.  Will need ECHO again this year. Orders placed.               - Pulmonary:  No chest/mediastinal radiation.  Clinical exam unremarkable.              - Musckuloskeletal:  High dose steroid exposure during therapy.  History of low vitamin D levels, continue on Vitamin D - Calcium supplementation with daily chews.  Vitamin D level on 11/3/2021- 21 ng/mL              - Growth and Development: Weight not obtained.  Doing better with regard to disordered eating.              -  Reproductive:  IUD in place. Anti-müllerian hormone level obtained on 11/3/2021- 2.052 ng/mL (baseline, WNL)              - Performance:  Karnofsky 100 / ECOG 1     Follow up for COG [id number]:       End of Therapy Follow-up Relapse   Physical Exam with VS X Monthly to 12 months  Q3 months to 36 months  Q6 months to 48 months  Yearly to 10 years X   Ht, Wt, BSA                          X   Performance Status X   X   CBC, Diff, Plts X Monthly to 12 months  Q3 months to 36 months  Q6 months to 48 months  Yearly to 10 years X   Electrolytes (Ca++, Mg++, K+) and Creatinine     X   Uric Acid     X   ECHO   X Yearly to 10 years X   BMA X   X         Disposition:  Return to clinic in 12 months for 75 month off therapy evaluation, PE, and labs        Kasey French MD  Pediatric Hematology / Oncology  [facility]  Cell.  [phone]  [facility]. [phone]

## 2022-01-18 ENCOUNTER — OFFICE VISIT (OUTPATIENT)
Dept: URGENT CARE | Facility: PHYSICIAN GROUP | Age: 22
End: 2022-01-18
Payer: COMMERCIAL

## 2022-01-18 VITALS
DIASTOLIC BLOOD PRESSURE: 74 MMHG | TEMPERATURE: 98.4 F | HEIGHT: 67 IN | SYSTOLIC BLOOD PRESSURE: 120 MMHG | HEART RATE: 102 BPM | WEIGHT: 181 LBS | BODY MASS INDEX: 28.41 KG/M2 | RESPIRATION RATE: 18 BRPM | OXYGEN SATURATION: 96 %

## 2022-01-18 DIAGNOSIS — S09.90XA INJURY OF HEAD, INITIAL ENCOUNTER: ICD-10-CM

## 2022-01-18 DIAGNOSIS — S06.0X1A CONCUSSION WITH LOSS OF CONSCIOUSNESS OF 30 MINUTES OR LESS, INITIAL ENCOUNTER: ICD-10-CM

## 2022-01-18 PROCEDURE — 99214 OFFICE O/P EST MOD 30 MIN: CPT | Performed by: PHYSICIAN ASSISTANT

## 2022-01-18 ASSESSMENT — ENCOUNTER SYMPTOMS
NAUSEA: 1
LOSS OF CONSCIOUSNESS: 1
DIZZINESS: 1
PHOTOPHOBIA: 0
TINGLING: 0
HEADACHES: 1
NECK PAIN: 0
DOUBLE VISION: 0
VOMITING: 0
BLURRED VISION: 0
SENSORY CHANGE: 0

## 2022-01-18 ASSESSMENT — FIBROSIS 4 INDEX: FIB4 SCORE: 0.33

## 2022-01-19 RX ORDER — GLYCOPYRROLATE 1 MG/1
TABLET ORAL
Qty: 60 | Refills: 1 | Status: ERX

## 2022-01-19 NOTE — PROGRESS NOTES
Subjective     Emy Francis is a 22 y.o. female who presents with Other (head injury,swollen,painful,nausea,confuse,extra sleepy,x1 day)            Patient is a pleasant 22-year-old female who presents to urgent care with her significant other who provides history today as well.  Patient reports that she was undergoing a TB skin test yesterday at her pediatrician's office.  Patient does have near syncopal episodes in the past with blood draws, and injections, cutting herself etc.  While patient was checking out she began to feel lightheaded and felt as though she needed to sit down during this process patient did have a witnessed syncopal episode of which it was believed that patient hit the back of her head on the edge of a counter.  Patient's believes she was out for a few seconds.  Patient then had examination after such at the office and was given apple juice which in general she was feeling slightly better.  At that time she felt soreness to the back of her head with slight nausea however denies any vomiting since the event.  She does report that the headache is waxing and waning in nature localizing to the back of the head.  Patient reports significant fatigue after the injury of which she did take a nap yesterday as well as last night.  Patient reports that she has been taking Tylenol with mild improvement of symptoms.  Her significant other does report that patient has been more in a fog today rather than confused.  He also reports that the swelling to the back of the head has improved as well.  Patient denies any change to her vision.  She does report low-grade lightheadedness however again this is transient currently denies such at this time.    Other  This is a new problem. The current episode started yesterday. The problem occurs constantly. The problem has been waxing and waning. Associated symptoms include headaches and nausea. Pertinent negatives include no neck pain or vomiting.  "Exacerbated by: Activity. She has tried acetaminophen for the symptoms. The treatment provided mild relief.   Denies any neck pain.     Review of Systems   Eyes: Negative for blurred vision, double vision and photophobia.   Gastrointestinal: Positive for nausea. Negative for vomiting.   Musculoskeletal: Negative for neck pain.   Neurological: Positive for dizziness, loss of consciousness and headaches. Negative for tingling and sensory change.   All other systems reviewed and are negative.             Objective     /74 (BP Location: Left arm, Patient Position: Sitting, BP Cuff Size: Adult)   Pulse (!) 102   Temp 36.9 °C (98.4 °F) (Temporal)   Resp 18   Ht 1.702 m (5' 7\")   Wt 82.1 kg (181 lb)   SpO2 96%   BMI 28.35 kg/m²    PMH:  has a past medical history of AML (acute myeloblastic leukemia) (HCC) and Cancer (HCC) (2015).  MEDS: Reviewed .   ALLERGIES:   Allergies   Allergen Reactions   • Other Environmental      Mosquito bites, dogs, cat fur     SURGHX:   Past Surgical History:   Procedure Laterality Date   • PB LAP,DIAGNOSTIC ABDOMEN Bilateral 7/15/2020    Procedure: PELVISCOPY;  Surgeon: Nazario Dotson M.D.;  Location: SURGERY SAME DAY [facility];  Service: Gynecology   • OVARIAN CYSTECTOMY Left 7/15/2020    Procedure: EXCISION, CYST, OVARY;  Surgeon: Nazario Dotson M.D.;  Location: SURGERY SAME DAY [facility];  Service: Gynecology   • INVASIVE LINE PLACEMENT PROCEDURE RM. Left 12/6/2015    port a cath     SOCHX:  reports that she has never smoked. She has never used smokeless tobacco. She reports current alcohol use. She reports that she does not use drugs.  FH: Family history was reviewed, no pertinent findings to report    Physical Exam  Vitals reviewed.   Constitutional:       General: She is not in acute distress.     Appearance: She is well-developed.   HENT:      Head:        Comments: Minimal scalp tenderness to the parietal aspect on the left without skin breakdown.  Minimal swelling " to this region.  Cervical spine without midline spinal tenderness, bony step-off.     Left Ear: Tympanic membrane and external ear normal.      Ears:      Comments: Cerumen to right canal-however able to visualize TM without evidence of hemotympanum.     Mouth/Throat:      Mouth: Mucous membranes are moist.   Eyes:      Conjunctiva/sclera: Conjunctivae normal.      Pupils: Pupils are equal, round, and reactive to light.   Neck:      Trachea: No tracheal deviation.   Cardiovascular:      Rate and Rhythm: Normal rate and regular rhythm.   Pulmonary:      Effort: Pulmonary effort is normal.      Breath sounds: Normal breath sounds.   Musculoskeletal:         General: Normal range of motion.      Cervical back: Normal range of motion and neck supple.   Skin:     General: Skin is warm.      Findings: No rash.      Comments: No rash to area exposed during the visit today.    Neurological:      General: No focal deficit present.      Mental Status: She is alert and oriented to person, place, and time.      Cranial Nerves: No cranial nerve deficit.      Coordination: Coordination is intact. Romberg sign negative. Coordination normal. Finger-Nose-Finger Test normal. Rapid alternating movements normal.      Gait: Gait is intact.   Psychiatric:         Behavior: Behavior normal.         Thought Content: Thought content normal.         Judgment: Judgment normal.                             Assessment & Plan        1. Injury of head, initial encounter  2. Concussion with loss of consciousness of 30 minutes or less, initial encounter            Reviewed [place] CT head score-of which patient does not meet criteria for imaging however does appear that patient is with mild concussive-like syndrome after vasovagal event yesterday.  Encouraged naproxen, ice, and importance of continued monitoring of symptoms.  Avoid strenuous activity, tasks that require acute focus, and activity as tolerated once feeling better.    Appropriate PPE  worn at all times by provider.   Pt. Had face mask on throughout entirety of the visit other than oropharyngeal examination today.     DDX, Supportive care, and indications for immediate follow-up discussed with patient.    Instructed to return to clinic or nearest emergency department if we are not available for any change in condition, further concerns, or worsening of symptoms.    The patient and/or guardian demonstrated a good understanding and agreed with the treatment plan.    Please note that this dictation was created using voice recognition software. I have made every reasonable attempt to correct obvious errors, but I expect that there are errors of grammar and possibly content that I did not discover before finalizing the note.

## 2022-01-27 ENCOUNTER — RX ONLY (OUTPATIENT)
Age: 22
Setting detail: RX ONLY
End: 2022-01-27

## 2022-01-27 RX ORDER — GLYCOPYRROLATE 1 MG/1
TABLET ORAL
Qty: 60 | Refills: 1

## 2022-03-10 ENCOUNTER — APPOINTMENT (RX ONLY)
Dept: URBAN - METROPOLITAN AREA CLINIC 22 | Facility: CLINIC | Age: 22
Setting detail: DERMATOLOGY
End: 2022-03-10

## 2022-03-10 DIAGNOSIS — L74.51 PRIMARY FOCAL HYPERHIDROSIS: ICD-10-CM

## 2022-03-10 DIAGNOSIS — L65.9 NONSCARRING HAIR LOSS, UNSPECIFIED: ICD-10-CM | Status: INADEQUATELY CONTROLLED

## 2022-03-10 PROBLEM — L74.512 PRIMARY FOCAL HYPERHIDROSIS, PALMS: Status: ACTIVE | Noted: 2022-03-10

## 2022-03-10 PROBLEM — L74.513 PRIMARY FOCAL HYPERHIDROSIS, SOLES: Status: ACTIVE | Noted: 2022-03-10

## 2022-03-10 PROCEDURE — ? PRESCRIPTION

## 2022-03-10 PROCEDURE — ? MEDICATION COUNSELING

## 2022-03-10 PROCEDURE — ? TREATMENT REGIMEN

## 2022-03-10 PROCEDURE — 99213 OFFICE O/P EST LOW 20 MIN: CPT

## 2022-03-10 PROCEDURE — ? COUNSELING

## 2022-03-10 RX ORDER — GLYCOPYRROLATE 1 MG/1
TABLET ORAL
Qty: 90 | Refills: 2 | Status: ERX

## 2022-03-10 ASSESSMENT — LOCATION DETAILED DESCRIPTION DERM
LOCATION DETAILED: RIGHT SOLE
LOCATION DETAILED: LEFT SOLE
LOCATION DETAILED: RIGHT MEDIAL FRONTAL SCALP
LOCATION DETAILED: RIGHT ULNAR PALM
LOCATION DETAILED: POSTERIOR MID-PARIETAL SCALP
LOCATION DETAILED: LEFT ULNAR PALM

## 2022-03-10 ASSESSMENT — LOCATION SIMPLE DESCRIPTION DERM
LOCATION SIMPLE: LEFT SOLE
LOCATION SIMPLE: RIGHT HAND
LOCATION SIMPLE: RIGHT SOLE
LOCATION SIMPLE: LEFT HAND
LOCATION SIMPLE: POSTERIOR SCALP
LOCATION SIMPLE: RIGHT SCALP

## 2022-03-10 ASSESSMENT — LOCATION ZONE DERM
LOCATION ZONE: HAND
LOCATION ZONE: FEET
LOCATION ZONE: SCALP

## 2022-03-10 NOTE — PROCEDURE: TREATMENT REGIMEN
Detail Level: Simple
Discontinue Regimen: Minoxidil over the counter
Initiate Treatment: Minoxidil 10% latanoprost 0.01% finasteride 0.1% biotin 0.2% compounded formula from dfw daily

## 2022-03-10 NOTE — PROCEDURE: MEDICATION COUNSELING
Oxybutynin Counseling:  I discussed with the patient the risks of oxybutynin including but not limited to skin rash, drowsiness, dry mouth, difficulty urinating, and blurred vision.
Use Enhanced Medication Counseling?: No
Bexarotene Counseling:  I discussed with the patient the risks of bexarotene including but not limited to hair loss, dry lips/skin/eyes, liver abnormalities, hyperlipidemia, pancreatitis, depression/suicidal ideation, photosensitivity, drug rash/allergic reactions, hypothyroidism, anemia, leukopenia, infection, cataracts, and teratogenicity.  Patient understands that they will need regular blood tests to check lipid profile, liver function tests, white blood cell count, thyroid function tests and pregnancy test if applicable.
Zyclara Pregnancy And Lactation Text: This medication is Pregnancy Category C. It is unknown if this medication is excreted in breast milk.
Calcipotriene Pregnancy And Lactation Text: This medication has not been proven safe during pregnancy. It is unknown if this medication is excreted in breast milk.
Mirvaso Pregnancy And Lactation Text: This medication has not been assigned a Pregnancy Risk Category. It is unknown if the medication is excreted in breast milk.
Gabapentin Counseling: I discussed with the patient the risks of gabapentin including but not limited to dizziness, somnolence, fatigue and ataxia.
Griseofulvin Counseling:  I discussed with the patient the risks of griseofulvin including but not limited to photosensitivity, cytopenia, liver damage, nausea/vomiting and severe allergy.  The patient understands that this medication is best absorbed when taken with a fatty meal (e.g., ice cream or french fries).
Humira Counseling:  I discussed with the patient the risks of adalimumab including but not limited to myelosuppression, immunosuppression, autoimmune hepatitis, demyelinating diseases, lymphoma, and serious infections.  The patient understands that monitoring is required including a PPD at baseline and must alert us or the primary physician if symptoms of infection or other concerning signs are noted.
Azathioprine Pregnancy And Lactation Text: This medication is Pregnancy Category D and isn't considered safe during pregnancy. It is unknown if this medication is excreted in breast milk.
Metronidazole Counseling:  I discussed with the patient the risks of metronidazole including but not limited to seizures, nausea/vomiting, a metallic taste in the mouth, nausea/vomiting and severe allergy.
Picato Counseling:  I discussed with the patient the risks of Picato including but not limited to erythema, scaling, itching, weeping, crusting, and pain.
Albendazole Counseling:  I discussed with the patient the risks of albendazole including but not limited to cytopenia, kidney damage, nausea/vomiting and severe allergy.  The patient understands that this medication is being used in an off-label manner.
Cellcept Counseling:  I discussed with the patient the risks of mycophenolate mofetil including but not limited to infection/immunosuppression, GI upset, hypokalemia, hypercholesterolemia, bone marrow suppression, lymphoproliferative disorders, malignancy, GI ulceration/bleed/perforation, colitis, interstitial lung disease, kidney failure, progressive multifocal leukoencephalopathy, and birth defects.  The patient understands that monitoring is required including a baseline creatinine and regular CBC testing. In addition, patient must alert us immediately if symptoms of infection or other concerning signs are noted.
Gabapentin Pregnancy And Lactation Text: This medication is Pregnancy Category C and isn't considered safe during pregnancy. It is excreted in breast milk.
Bexarotene Pregnancy And Lactation Text: This medication is Pregnancy Category X and should not be given to women who are pregnant or may become pregnant. This medication should not be used if you are breast feeding.
Oxybutynin Pregnancy And Lactation Text: This medication is Pregnancy Category B and is considered safe during pregnancy. It is unknown if it is excreted in breast milk.
Griseofulvin Pregnancy And Lactation Text: This medication is Pregnancy Category X and is known to cause serious birth defects. It is unknown if this medication is excreted in breast milk but breast feeding should be avoided.
Opioid Counseling: I discussed with the patient the potential side effects of opioids including but not limited to addiction, altered mental status, and depression. I stressed avoiding alcohol, benzodiazepines, muscle relaxants and sleep aids unless specifically okayed by a physician. The patient verbalized understanding of the proper use and possible adverse effects of opioids. All of the patient's questions and concerns were addressed. They were instructed to flush the remaining pills down the toilet if they did not need them for pain.
Humira Pregnancy And Lactation Text: This medication is Pregnancy Category B and is considered safe during pregnancy. It is unknown if this medication is excreted in breast milk.
Metronidazole Pregnancy And Lactation Text: This medication is Pregnancy Category B and considered safe during pregnancy.  It is also excreted in breast milk.
5-Fu Counseling: 5-Fluorouracil Counseling:  I discussed with the patient the risks of 5-fluorouracil including but not limited to erythema, scaling, itching, weeping, crusting, and pain.
Propranolol Counseling:  I discussed with the patient the risks of propranolol including but not limited to low heart rate, low blood pressure, low blood sugar, restlessness and increased cold sensitivity. They should call the office if they experience any of these side effects.
Albendazole Pregnancy And Lactation Text: This medication is Pregnancy Category C and it isn't known if it is safe during pregnancy. It is also excreted in breast milk.
5-Fu Pregnancy And Lactation Text: This medication is Pregnancy Category X and contraindicated in pregnancy and in women who may become pregnant. It is unknown if this medication is excreted in breast milk.
Itraconazole Counseling:  I discussed with the patient the risks of itraconazole including but not limited to liver damage, nausea/vomiting, neuropathy, and severe allergy.  The patient understands that this medication is best absorbed when taken with acidic beverages such as non-diet cola or ginger ale.  The patient understands that monitoring is required including baseline LFTs and repeat LFTs at intervals.  The patient understands that they are to contact us or the primary physician if concerning signs are noted.
Ilumya Counseling: I discussed with the patient the risks of tildrakizumab including but not limited to immunosuppression, malignancy, posterior leukoencephalopathy syndrome, and serious infections.  The patient understands that monitoring is required including a PPD at baseline and must alert us or the primary physician if symptoms of infection or other concerning signs are noted.
Isotretinoin Counseling: Patient should get monthly blood tests, not donate blood, not drive at night if vision affected, not share medication, and not undergo elective surgery for 6 months after tx completed. Side effects reviewed, pt to contact office should one occur.
Glycopyrrolate Counseling:  I discussed with the patient the risks of glycopyrrolate including but not limited to skin rash, drowsiness, dry mouth, difficulty urinating, and blurred vision.
Opioid Pregnancy And Lactation Text: These medications can lead to premature delivery and should be avoided during pregnancy. These medications are also present in breast milk in small amounts.
Minocycline Counseling: Patient advised regarding possible photosensitivity and discoloration of the teeth, skin, lips, tongue and gums.  Patient instructed to avoid sunlight, if possible.  When exposed to sunlight, patients should wear protective clothing, sunglasses, and sunscreen.  The patient was instructed to call the office immediately if the following severe adverse effects occur:  hearing changes, easy bruising/bleeding, severe headache, or vision changes.  The patient verbalized understanding of the proper use and possible adverse effects of minocycline.  All of the patient's questions and concerns were addressed.
Ivermectin Counseling:  Patient instructed to take medication on an empty stomach with a full glass of water.  Patient informed of potential adverse effects including but not limited to nausea, diarrhea, dizziness, itching, and swelling of the extremities or lymph nodes.  The patient verbalized understanding of the proper use and possible adverse effects of ivermectin.  All of the patient's questions and concerns were addressed.
Protopic Counseling: Patient may experience a mild burning sensation during topical application. Protopic is not approved in children less than 2 years of age. There have been case reports of hematologic and skin malignancies in patients using topical calcineurin inhibitors although causality is questionable.
Glycopyrrolate Pregnancy And Lactation Text: This medication is Pregnancy Category B and is considered safe during pregnancy. It is unknown if it is excreted breast milk.
Propranolol Pregnancy And Lactation Text: This medication is Pregnancy Category C and it isn't known if it is safe during pregnancy. It is excreted in breast milk.
Drysol Counseling:  I discussed with the patient the risks of drysol/aluminum chloride including but not limited to skin rash, itching, irritation, burning.
Minocycline Pregnancy And Lactation Text: This medication is Pregnancy Category D and not consider safe during pregnancy. It is also excreted in breast milk.
Itraconazole Pregnancy And Lactation Text: This medication is Pregnancy Category C and it isn't know if it is safe during pregnancy. It is also excreted in breast milk.
Cyclophosphamide Counseling:  I discussed with the patient the risks of cyclophosphamide including but not limited to hair loss, hormonal abnormalities, decreased fertility, abdominal pain, diarrhea, nausea and vomiting, bone marrow suppression and infection. The patient understands that monitoring is required while taking this medication.
Azithromycin Counseling:  I discussed with the patient the risks of azithromycin including but not limited to GI upset, allergic reaction, drug rash, diarrhea, and yeast infections.
Isotretinoin Pregnancy And Lactation Text: This medication is Pregnancy Category X and is considered extremely dangerous during pregnancy. It is unknown if it is excreted in breast milk.
Ilumya Pregnancy And Lactation Text: The risk during pregnancy and breastfeeding is uncertain with this medication.
Birth Control Pills Counseling: Birth Control Pill Counseling: I discussed with the patient the potential side effects of OCPs including but not limited to increased risk of stroke, heart attack, thrombophlebitis, deep venous thrombosis, hepatic adenomas, breast changes, GI upset, headaches, and depression.  The patient verbalized understanding of the proper use and possible adverse effects of OCPs. All of the patient's questions and concerns were addressed.
Protopic Pregnancy And Lactation Text: This medication is Pregnancy Category C. It is unknown if this medication is excreted in breast milk when applied topically.
Drysol Pregnancy And Lactation Text: This medication is considered safe during pregnancy and breast feeding.
Hydroxychloroquine Counseling:  I discussed with the patient that a baseline ophthalmologic exam is needed at the start of therapy and every year thereafter while on therapy. A CBC may also be warranted for monitoring.  The side effects of this medication were discussed with the patient, including but not limited to agranulocytosis, aplastic anemia, seizures, rashes, retinopathy, and liver toxicity. Patient instructed to call the office should any adverse effect occur.  The patient verbalized understanding of the proper use and possible adverse effects of Plaquenil.  All the patient's questions and concerns were addressed.
Arava Counseling:  Patient counseled regarding adverse effects of Arava including but not limited to nausea, vomiting, abnormalities in liver function tests. Patients may develop mouth sores, rash, diarrhea, and abnormalities in blood counts. The patient understands that monitoring is required including LFTs and blood counts.  There is a rare possibility of scarring of the liver and lung problems that can occur when taking methotrexate. Persistent nausea, loss of appetite, pale stools, dark urine, cough, and shortness of breath should be reported immediately. Patient advised to discontinue Arava treatment and consult with a physician prior to attempting conception. The patient will have to undergo a treatment to eliminate Arava from the body prior to conception.
High Dose Vitamin A Counseling: Side effects reviewed, pt to contact office should one occur.
Ketoconazole Counseling:   Patient counseled regarding improving absorption with orange juice.  Adverse effects include but are not limited to breast enlargement, headache, diarrhea, nausea, upset stomach, liver function test abnormalities, taste disturbance, and stomach pain.  There is a rare possibility of liver failure that can occur when taking ketoconazole. The patient understands that monitoring of LFTs may be required, especially at baseline. The patient verbalized understanding of the proper use and possible adverse effects of ketoconazole.  All of the patient's questions and concerns were addressed.
Infliximab Counseling:  I discussed with the patient the risks of infliximab including but not limited to myelosuppression, immunosuppression, autoimmune hepatitis, demyelinating diseases, lymphoma, and serious infections.  The patient understands that monitoring is required including a PPD at baseline and must alert us or the primary physician if symptoms of infection or other concerning signs are noted.
Azithromycin Pregnancy And Lactation Text: This medication is considered safe during pregnancy and is also secreted in breast milk.
Quinolones Counseling:  I discussed with the patient the risks of fluoroquinolones including but not limited to GI upset, allergic reaction, drug rash, diarrhea, dizziness, photosensitivity, yeast infections, liver function test abnormalities, tendonitis/tendon rupture.
Birth Control Pills Pregnancy And Lactation Text: This medication should be avoided if pregnant and for the first 30 days post-partum.
Cyclophosphamide Pregnancy And Lactation Text: This medication is Pregnancy Category D and it isn't considered safe during pregnancy. This medication is excreted in breast milk.
Hydroxychloroquine Pregnancy And Lactation Text: This medication has been shown to cause fetal harm but it isn't assigned a Pregnancy Risk Category. There are small amounts excreted in breast milk.
Rhofade Counseling: Rhofade is a topical medication which can decrease superficial blood flow where applied. Side effects are uncommon and include stinging, redness and allergic reactions.
Elidel Counseling: Patient may experience a mild burning sensation during topical application. Elidel is not approved in children less than 2 years of age. There have been case reports of hematologic and skin malignancies in patients using topical calcineurin inhibitors although causality is questionable.
Taltz Counseling: I discussed with the patient the risks of ixekizumab including but not limited to immunosuppression, serious infections, worsening of inflammatory bowel disease and drug reactions.  The patient understands that monitoring is required including a PPD at baseline and must alert us or the primary physician if symptoms of infection or other concerning signs are noted.
Ketoconazole Pregnancy And Lactation Text: This medication is Pregnancy Category C and it isn't know if it is safe during pregnancy. It is also excreted in breast milk and breast feeding isn't recommended.
Cyclosporine Counseling:  I discussed with the patient the risks of cyclosporine including but not limited to hypertension, gingival hyperplasia,myelosuppression, immunosuppression, liver damage, kidney damage, neurotoxicity, lymphoma, and serious infections. The patient understands that monitoring is required including baseline blood pressure, CBC, CMP, lipid panel and uric acid, and then 1-2 times monthly CMP and blood pressure.
High Dose Vitamin A Pregnancy And Lactation Text: High dose vitamin A therapy is contraindicated during pregnancy and breast feeding.
Arava Pregnancy And Lactation Text: This medication is Pregnancy Category X and is absolutely contraindicated during pregnancy. It is unknown if it is excreted in breast milk.
Libtayo Counseling- I discussed with the patient the risks of Libtayo including but not limited to nausea, vomiting, diarrhea, and bone or muscle pain.  The patient verbalized understanding of the proper use and possible adverse effects of Libtayo.  All of the patient's questions and concerns were addressed.
Spironolactone Counseling: Patient advised regarding risks of diarrhea, abdominal pain, hyperkalemia, birth defects (for female patients), liver toxicity and renal toxicity. The patient may need blood work to monitor liver and kidney function and potassium levels while on therapy. The patient verbalized understanding of the proper use and possible adverse effects of spironolactone.  All of the patient's questions and concerns were addressed.
Bactrim Counseling:  I discussed with the patient the risks of sulfa antibiotics including but not limited to GI upset, allergic reaction, drug rash, diarrhea, dizziness, photosensitivity, and yeast infections.  Rarely, more serious reactions can occur including but not limited to aplastic anemia, agranulocytosis, methemoglobinemia, blood dyscrasias, liver or kidney failure, lung infiltrates or desquamative/blistering drug rashes.
Clofazimine Counseling:  I discussed with the patient the risks of clofazimine including but not limited to skin and eye pigmentation, liver damage, nausea/vomiting, gastrointestinal bleeding and allergy.
Detail Level: Detailed
Rituxan Counseling:  I discussed with the patient the risks of Rituxan infusions. Side effects can include infusion reactions, severe drug rashes including mucocutaneous reactions, reactivation of latent hepatitis and other infections and rarely progressive multifocal leukoencephalopathy.  All of the patient's questions and concerns were addressed.
Solaraze Counseling:  I discussed with the patient the risks of Solaraze including but not limited to erythema, scaling, itching, weeping, crusting, and pain.
Rifampin Counseling: I discussed with the patient the risks of rifampin including but not limited to liver damage, kidney damage, red-orange body fluids, nausea/vomiting and severe allergy.
Terbinafine Counseling: Patient counseling regarding adverse effects of terbinafine including but not limited to headache, diarrhea, rash, upset stomach, liver function test abnormalities, itching, taste/smell disturbance, nausea, abdominal pain, and flatulence.  There is a rare possibility of liver failure that can occur when taking terbinafine.  The patient understands that a baseline LFT and kidney function test may be required. The patient verbalized understanding of the proper use and possible adverse effects of terbinafine.  All of the patient's questions and concerns were addressed.
Cyclosporine Pregnancy And Lactation Text: This medication is Pregnancy Category C and it isn't know if it is safe during pregnancy. This medication is excreted in breast milk.
Bactrim Pregnancy And Lactation Text: This medication is Pregnancy Category D and is known to cause fetal risk.  It is also excreted in breast milk.
Spironolactone Pregnancy And Lactation Text: This medication can cause feminization of the male fetus and should be avoided during pregnancy. The active metabolite is also found in breast milk.
Libtayo Pregnancy And Lactation Text: This medication is contraindicated in pregnancy and when breast feeding.
Eucrisa Counseling: Patient may experience a mild burning sensation during topical application. Eucrisa is not approved in children less than 2 years of age.
Terbinafine Pregnancy And Lactation Text: This medication is Pregnancy Category B and is considered safe during pregnancy. It is also excreted in breast milk and breast feeding isn't recommended.
Cimzia Counseling:  I discussed with the patient the risks of Cimzia including but not limited to immunosuppression, allergic reactions and infections.  The patient understands that monitoring is required including a PPD at baseline and must alert us or the primary physician if symptoms of infection or other concerning signs are noted.
Solaraze Pregnancy And Lactation Text: This medication is Pregnancy Category B and is considered safe. There is some data to suggest avoiding during the third trimester. It is unknown if this medication is excreted in breast milk.
Rituxan Pregnancy And Lactation Text: This medication is Pregnancy Category C and it isn't know if it is safe during pregnancy. It is unknown if this medication is excreted in breast milk but similar antibodies are known to be excreted.
Tremfya Counseling: I discussed with the patient the risks of guselkumab including but not limited to immunosuppression, serious infections, worsening of inflammatory bowel disease and drug reactions.  The patient understands that monitoring is required including a PPD at baseline and must alert us or the primary physician if symptoms of infection or other concerning signs are noted.
Rifampin Pregnancy And Lactation Text: This medication is Pregnancy Category C and it isn't know if it is safe during pregnancy. It is also excreted in breast milk and should not be used if you are breast feeding.
Cephalexin Counseling: I counseled the patient regarding use of cephalexin as an antibiotic for prophylactic and/or therapeutic purposes. Cephalexin (commonly prescribed under brand name Keflex) is a cephalosporin antibiotic which is active against numerous classes of bacteria, including most skin bacteria. Side effects may include nausea, diarrhea, gastrointestinal upset, rash, hives, yeast infections, and in rare cases, hepatitis, kidney disease, seizures, fever, confusion, neurologic symptoms, and others. Patients with severe allergies to penicillin medications are cautioned that there is about a 10% incidence of cross-reactivity with cephalosporins. When possible, patients with penicillin allergies should use alternatives to cephalosporins for antibiotic therapy.
SSKI Counseling:  I discussed with the patient the risks of SSKI including but not limited to thyroid abnormalities, metallic taste, GI upset, fever, headache, acne, arthralgias, paraesthesias, lymphadenopathy, easy bleeding, arrhythmias, and allergic reaction.
Niacinamide Counseling: I recommended taking niacin or niacinamide, also know as vitamin B3, twice daily. Recent evidence suggests that taking vitamin B3 (500 mg twice daily) can reduce the risk of actinic keratoses and non-melanoma skin cancers. Side effects of vitamin B3 include flushing and headache.
Methotrexate Counseling:  Patient counseled regarding adverse effects of methotrexate including but not limited to nausea, vomiting, abnormalities in liver function tests. Patients may develop mouth sores, rash, diarrhea, and abnormalities in blood counts. The patient understands that monitoring is required including LFT's and blood counts.  There is a rare possibility of scarring of the liver and lung problems that can occur when taking methotrexate. Persistent nausea, loss of appetite, pale stools, dark urine, cough, and shortness of breath should be reported immediately. Patient advised to discontinue methotrexate treatment at least three months before attempting to become pregnant.  I discussed the need for folate supplements while taking methotrexate.  These supplements can decrease side effects during methotrexate treatment. The patient verbalized understanding of the proper use and possible adverse effects of methotrexate.  All of the patient's questions and concerns were addressed.
Colchicine Counseling:  Patient counseled regarding adverse effects including but not limited to stomach upset (nausea, vomiting, stomach pain, or diarrhea).  Patient instructed to limit alcohol consumption while taking this medication.  Colchicine may reduce blood counts especially with prolonged use.  The patient understands that monitoring of kidney function and blood counts may be required, especially at baseline. The patient verbalized understanding of the proper use and possible adverse effects of colchicine.  All of the patient's questions and concerns were addressed.
Eucrisa Pregnancy And Lactation Text: This medication has not been assigned a Pregnancy Risk Category but animal studies failed to show danger with the topical medication. It is unknown if the medication is excreted in breast milk.
Sarecycline Counseling: Patient advised regarding possible photosensitivity and discoloration of the teeth, skin, lips, tongue and gums.  Patient instructed to avoid sunlight, if possible.  When exposed to sunlight, patients should wear protective clothing, sunglasses, and sunscreen.  The patient was instructed to call the office immediately if the following severe adverse effects occur:  hearing changes, easy bruising/bleeding, severe headache, or vision changes.  The patient verbalized understanding of the proper use and possible adverse effects of sarecycline.  All of the patient's questions and concerns were addressed.
Topical Retinoid counseling:  Patient advised to apply a pea-sized amount only at bedtime and wait 30 minutes after washing their face before applying.  If too drying, patient may add a non-comedogenic moisturizer. The patient verbalized understanding of the proper use and possible adverse effects of retinoids.  All of the patient's questions and concerns were addressed.
Niacinamide Pregnancy And Lactation Text: These medications are considered safe during pregnancy.
Sski Pregnancy And Lactation Text: This medication is Pregnancy Category D and isn't considered safe during pregnancy. It is excreted in breast milk.
Hydroquinone Counseling:  Patient advised that medication may result in skin irritation, lightening (hypopigmentation), dryness, and burning.  In the event of skin irritation, the patient was advised to reduce the amount of the drug applied or use it less frequently.  Rarely, spots that are treated with hydroquinone can become darker (pseudoochronosis).  Should this occur, patient instructed to stop medication and call the office. The patient verbalized understanding of the proper use and possible adverse effects of hydroquinone.  All of the patient's questions and concerns were addressed.
Cimzia Pregnancy And Lactation Text: This medication crosses the placenta but can be considered safe in certain situations. Cimzia may be excreted in breast milk.
Siliq Counseling:  I discussed with the patient the risks of Siliq including but not limited to new or worsening depression, suicidal thoughts and behavior, immunosuppression, malignancy, posterior leukoencephalopathy syndrome, and serious infections.  The patient understands that monitoring is required including a PPD at baseline and must alert us or the primary physician if symptoms of infection or other concerning signs are noted. There is also a special program designed to monitor depression which is required with Siliq.
Cephalexin Pregnancy And Lactation Text: This medication is Pregnancy Category B and considered safe during pregnancy.  It is also excreted in breast milk but can be used safely for shorter doses.
Methotrexate Pregnancy And Lactation Text: This medication is Pregnancy Category X and is known to cause fetal harm. This medication is excreted in breast milk.
Thalidomide Counseling: I discussed with the patient the risks of thalidomide including but not limited to birth defects, anxiety, weakness, chest pain, dizziness, cough and severe allergy.
Cimetidine Counseling:  I discussed with the patient the risks of Cimetidine including but not limited to gynecomastia, headache, diarrhea, nausea, drowsiness, arrhythmias, pancreatitis, skin rashes, psychosis, bone marrow suppression and kidney toxicity.
Cosentyx Counseling:  I discussed with the patient the risks of Cosentyx including but not limited to worsening of Crohn's disease, immunosuppression, allergic reactions and infections.  The patient understands that monitoring is required including a PPD at baseline and must alert us or the primary physician if symptoms of infection or other concerning signs are noted.
Xeljanz Counseling: I discussed with the patient the risks of Xeljanz therapy including increased risk of infection, liver issues, headache, diarrhea, or cold symptoms. Live vaccines should be avoided. They were instructed to call if they have any problems.
Clindamycin Counseling: I counseled the patient regarding use of clindamycin as an antibiotic for prophylactic and/or therapeutic purposes. Clindamycin is active against numerous classes of bacteria, including skin bacteria. Side effects may include nausea, diarrhea, gastrointestinal upset, rash, hives, yeast infections, and in rare cases, colitis.
Nsaids Counseling: NSAID Counseling: I discussed with the patient that NSAIDs should be taken with food. Prolonged use of NSAIDs can result in the development of stomach ulcers.  Patient advised to stop taking NSAIDs if abdominal pain occurs.  The patient verbalized understanding of the proper use and possible adverse effects of NSAIDs.  All of the patient's questions and concerns were addressed.
Dapsone Counseling: I discussed with the patient the risks of dapsone including but not limited to hemolytic anemia, agranulocytosis, rashes, methemoglobinemia, kidney failure, peripheral neuropathy, headaches, GI upset, and liver toxicity.  Patients who start dapsone require monitoring including baseline LFTs and weekly CBCs for the first month, then every month thereafter.  The patient verbalized understanding of the proper use and possible adverse effects of dapsone.  All of the patient's questions and concerns were addressed.
Xelsergioz Pregnancy And Lactation Text: This medication is Pregnancy Category D and is not considered safe during pregnancy.  The risk during breast feeding is also uncertain.
Prednisone Counseling:  I discussed with the patient the risks of prolonged use of prednisone including but not limited to weight gain, insomnia, osteoporosis, mood changes, diabetes, susceptibility to infection, glaucoma and high blood pressure.  In cases where prednisone use is prolonged, patients should be monitored with blood pressure checks, serum glucose levels and an eye exam.  Additionally, the patient may need to be placed on GI prophylaxis, PCP prophylaxis, and calcium and vitamin D supplementation and/or a bisphosphonate.  The patient verbalized understanding of the proper use and the possible adverse effects of prednisone.  All of the patient's questions and concerns were addressed.
Tetracycline Counseling: Patient counseled regarding possible photosensitivity and increased risk for sunburn.  Patient instructed to avoid sunlight, if possible.  When exposed to sunlight, patients should wear protective clothing, sunglasses, and sunscreen.  The patient was instructed to call the office immediately if the following severe adverse effects occur:  hearing changes, easy bruising/bleeding, severe headache, or vision changes.  The patient verbalized understanding of the proper use and possible adverse effects of tetracycline.  All of the patient's questions and concerns were addressed. Patient understands to avoid pregnancy while on therapy due to potential birth defects.
Topical Sulfur Applications Counseling: Topical Sulfur Counseling: Patient counseled that this medication may cause skin irritation or allergic reactions.  In the event of skin irritation, the patient was advised to reduce the amount of the drug applied or use it less frequently.   The patient verbalized understanding of the proper use and possible adverse effects of topical sulfur application.  All of the patient's questions and concerns were addressed.
Nsaids Pregnancy And Lactation Text: These medications are considered safe up to 30 weeks gestation. It is excreted in breast milk.
Tazorac Counseling:  Patient advised that medication is irritating and drying.  Patient may need to apply sparingly and wash off after an hour before eventually leaving it on overnight.  The patient verbalized understanding of the proper use and possible adverse effects of tazorac.  All of the patient's questions and concerns were addressed.
Benzoyl Peroxide Counseling: Patient counseled that medicine may cause skin irritation and bleach clothing.  In the event of skin irritation, the patient was advised to reduce the amount of the drug applied or use it less frequently.   The patient verbalized understanding of the proper use and possible adverse effects of benzoyl peroxide.  All of the patient's questions and concerns were addressed.
Simponi Counseling:  I discussed with the patient the risks of golimumab including but not limited to myelosuppression, immunosuppression, autoimmune hepatitis, demyelinating diseases, lymphoma, and serious infections.  The patient understands that monitoring is required including a PPD at baseline and must alert us or the primary physician if symptoms of infection or other concerning signs are noted.
Imiquimod Counseling:  I discussed with the patient the risks of imiquimod including but not limited to erythema, scaling, itching, weeping, crusting, and pain.  Patient understands that the inflammatory response to imiquimod is variable from person to person and was educated regarded proper titration schedule.  If flu-like symptoms develop, patient knows to discontinue the medication and contact us.
Clindamycin Pregnancy And Lactation Text: This medication can be used in pregnancy if certain situations. Clindamycin is also present in breast milk.
Dapsone Pregnancy And Lactation Text: This medication is Pregnancy Category C and is not considered safe during pregnancy or breast feeding.
Doxepin Counseling:  Patient advised that the medication is sedating and not to drive a car after taking this medication. Patient informed of potential adverse effects including but not limited to dry mouth, urinary retention, and blurry vision.  The patient verbalized understanding of the proper use and possible adverse effects of doxepin.  All of the patient's questions and concerns were addressed.
Tazorac Pregnancy And Lactation Text: This medication is not safe during pregnancy. It is unknown if this medication is excreted in breast milk.
Tranexamic Acid Counseling:  Patient advised of the small risk of bleeding problems with tranexamic acid. They were also instructed to call if they developed any nausea, vomiting or diarrhea. All of the patient's questions and concerns were addressed.
Topical Sulfur Applications Pregnancy And Lactation Text: This medication is Pregnancy Category C and has an unknown safety profile during pregnancy. It is unknown if this topical medication is excreted in breast milk.
Xolair Counseling:  Patient informed of potential adverse effects including but not limited to fever, muscle aches, rash and allergic reactions.  The patient verbalized understanding of the proper use and possible adverse effects of Xolair.  All of the patient's questions and concerns were addressed.
Dupixent Counseling: I discussed with the patient the risks of dupilumab including but not limited to eye infection and irritation, cold sores, injection site reactions, worsening of asthma, allergic reactions and increased risk of parasitic infection.  Live vaccines should be avoided while taking dupilumab. Dupilumab will also interact with certain medications such as warfarin and cyclosporine. The patient understands that monitoring is required and they must alert us or the primary physician if symptoms of infection or other concerning signs are noted.
Doxycycline Counseling:  Patient counseled regarding possible photosensitivity and increased risk for sunburn.  Patient instructed to avoid sunlight, if possible.  When exposed to sunlight, patients should wear protective clothing, sunglasses, and sunscreen.  The patient was instructed to call the office immediately if the following severe adverse effects occur:  hearing changes, easy bruising/bleeding, severe headache, or vision changes.  The patient verbalized understanding of the proper use and possible adverse effects of doxycycline.  All of the patient's questions and concerns were addressed.
Erivedge Counseling- I discussed with the patient the risks of Erivedge including but not limited to nausea, vomiting, diarrhea, constipation, weight loss, changes in the sense of taste, decreased appetite, muscle spasms, and hair loss.  The patient verbalized understanding of the proper use and possible adverse effects of Erivedge.  All of the patient's questions and concerns were addressed.
Odomzo Counseling- I discussed with the patient the risks of Odomzo including but not limited to nausea, vomiting, diarrhea, constipation, weight loss, changes in the sense of taste, decreased appetite, muscle spasms, and hair loss.  The patient verbalized understanding of the proper use and possible adverse effects of Odomzo.  All of the patient's questions and concerns were addressed.
Benzoyl Peroxide Pregnancy And Lactation Text: This medication is Pregnancy Category C. It is unknown if benzoyl peroxide is excreted in breast milk.
Wartpeel Counseling:  I discussed with the patient the risks of Wartpeel including but not limited to erythema, scaling, itching, weeping, crusting, and pain.
Carac Counseling:  I discussed with the patient the risks of Carac including but not limited to erythema, scaling, itching, weeping, crusting, and pain.
Tranexamic Acid Pregnancy And Lactation Text: It is unknown if this medication is safe during pregnancy or breast feeding.
Doxepin Pregnancy And Lactation Text: This medication is Pregnancy Category C and it isn't known if it is safe during pregnancy. It is also excreted in breast milk and breast feeding isn't recommended.
Minoxidil Counseling: Minoxidil is a topical medication which can increase blood flow where it is applied. It is uncertain how this medication increases hair growth. Side effects are uncommon and include stinging and allergic reactions.
Topical Clindamycin Counseling: Patient counseled that this medication may cause skin irritation or allergic reactions.  In the event of skin irritation, the patient was advised to reduce the amount of the drug applied or use it less frequently.   The patient verbalized understanding of the proper use and possible adverse effects of clindamycin.  All of the patient's questions and concerns were addressed.
Skyrizi Counseling: I discussed with the patient the risks of risankizumab-rzaa including but not limited to immunosuppression, and serious infections.  The patient understands that monitoring is required including a PPD at baseline and must alert us or the primary physician if symptoms of infection or other concerning signs are noted.
Dupixent Pregnancy And Lactation Text: This medication likely crosses the placenta but the risk for the fetus is uncertain. This medication is excreted in breast milk.
Xolair Pregnancy And Lactation Text: This medication is Pregnancy Category B and is considered safe during pregnancy. This medication is excreted in breast milk.
Doxycycline Pregnancy And Lactation Text: This medication is Pregnancy Category D and not consider safe during pregnancy. It is also excreted in breast milk but is considered safe for shorter treatment courses.
Hydroxyzine Counseling: Patient advised that the medication is sedating and not to drive a car after taking this medication.  Patient informed of potential adverse effects including but not limited to dry mouth, urinary retention, and blurry vision.  The patient verbalized understanding of the proper use and possible adverse effects of hydroxyzine.  All of the patient's questions and concerns were addressed.
Acitretin Counseling:  I discussed with the patient the risks of acitretin including but not limited to hair loss, dry lips/skin/eyes, liver damage, hyperlipidemia, depression/suicidal ideation, photosensitivity.  Serious rare side effects can include but are not limited to pancreatitis, pseudotumor cerebri, bony changes, clot formation/stroke/heart attack.  Patient understands that alcohol is contraindicated since it can result in liver toxicity and significantly prolong the elimination of the drug by many years.
Valtrex Counseling: I discussed with the patient the risks of valacyclovir including but not limited to kidney damage, nausea, vomiting and severe allergy.  The patient understands that if the infection seems to be worsening or is not improving, they are to call.
Fluconazole Counseling:  Patient counseled regarding adverse effects of fluconazole including but not limited to headache, diarrhea, nausea, upset stomach, liver function test abnormalities, taste disturbance, and stomach pain.  There is a rare possibility of liver failure that can occur when taking fluconazole.  The patient understands that monitoring of LFTs and kidney function test may be required, especially at baseline. The patient verbalized understanding of the proper use and possible adverse effects of fluconazole.  All of the patient's questions and concerns were addressed.
Finasteride Male Counseling: Finasteride Counseling:  I discussed with the patient the risks of use of finasteride including but not limited to decreased libido, decreased ejaculate volume, gynecomastia, and depression. Women should not handle medication.  All of the patient's questions and concerns were addressed.
Otezla Counseling: The side effects of Otezla were discussed with the patient, including but not limited to worsening or new depression, weight loss, diarrhea, nausea, upper respiratory tract infection, and headache. Patient instructed to call the office should any adverse effect occur.  The patient verbalized understanding of the proper use and possible adverse effects of Otezla.  All the patient's questions and concerns were addressed.
Enbrel Counseling:  I discussed with the patient the risks of etanercept including but not limited to myelosuppression, immunosuppression, autoimmune hepatitis, demyelinating diseases, lymphoma, and infections.  The patient understands that monitoring is required including a PPD at baseline and must alert us or the primary physician if symptoms of infection or other concerning signs are noted.
Acitretin Pregnancy And Lactation Text: This medication is Pregnancy Category X and should not be given to women who are pregnant or may become pregnant in the future. This medication is excreted in breast milk.
Erythromycin Counseling:  I discussed with the patient the risks of erythromycin including but not limited to GI upset, allergic reaction, drug rash, diarrhea, increase in liver enzymes, and yeast infections.
Valtrex Pregnancy And Lactation Text: this medication is Pregnancy Category B and is considered safe during pregnancy. This medication is not directly found in breast milk but it's metabolite acyclovir is present.
Zyclara Counseling:  I discussed with the patient the risks of imiquimod including but not limited to erythema, scaling, itching, weeping, crusting, and pain.  Patient understands that the inflammatory response to imiquimod is variable from person to person and was educated regarded proper titration schedule.  If flu-like symptoms develop, patient knows to discontinue the medication and contact us.
Hydroxyzine Pregnancy And Lactation Text: This medication is not safe during pregnancy and should not be taken. It is also excreted in breast milk and breast feeding isn't recommended.
Mirvaso Counseling: Mirvaso is a topical medication which can decrease superficial blood flow where applied. Side effects are uncommon and include stinging, redness and allergic reactions.
Calcipotriene Counseling:  I discussed with the patient the risks of calcipotriene including but not limited to erythema, scaling, itching, and irritation.
Azathioprine Counseling:  I discussed with the patient the risks of azathioprine including but not limited to myelosuppression, immunosuppression, hepatotoxicity, lymphoma, and infections.  The patient understands that monitoring is required including baseline LFTs, Creatinine, possible TPMP genotyping and weekly CBCs for the first month and then every 2 weeks thereafter.  The patient verbalized understanding of the proper use and possible adverse effects of azathioprine.  All of the patient's questions and concerns were addressed.
Otezla Pregnancy And Lactation Text: This medication is Pregnancy Category C and it isn't known if it is safe during pregnancy. It is unknown if it is excreted in breast milk.
Stelara Counseling:  I discussed with the patient the risks of ustekinumab including but not limited to immunosuppression, malignancy, posterior leukoencephalopathy syndrome, and serious infections.  The patient understands that monitoring is required including a PPD at baseline and must alert us or the primary physician if symptoms of infection or other concerning signs are noted.
Erythromycin Pregnancy And Lactation Text: This medication is Pregnancy Category B and is considered safe during pregnancy. It is also excreted in breast milk.
Finasteride Pregnancy And Lactation Text: This medication is absolutely contraindicated during pregnancy. It is unknown if it is excreted in breast milk.
Klisyri Pregnancy And Lactation Text: It is unknown if this medication can harm a developing fetus or if it is excreted in breast milk.
Oral Minoxidil Pregnancy And Lactation Text: This medication should only be used when clearly needed if you are pregnant, attempting to become pregnant or breast feeding.
Winlevi Pregnancy And Lactation Text: This medication is considered safe during pregnancy and breastfeeding.
Klisyri Counseling:  I discussed with the patient the risks of Klisyri including but not limited to erythema, scaling, itching, weeping, crusting, and pain.
Azelaic Acid Counseling: Patient counseled that medicine may cause skin irritation and to avoid applying near the eyes.  In the event of skin irritation, the patient was advised to reduce the amount of the drug applied or use it less frequently.   The patient verbalized understanding of the proper use and possible adverse effects of azelaic acid.  All of the patient's questions and concerns were addressed.
Dutasteride Male Counseling: Dustasteride Counseling:  I discussed with the patient the risks of use of dutasteride including but not limited to decreased libido, decreased ejaculate volume, and gynecomastia. Women who can become pregnant should not handle medication.  All of the patient's questions and concerns were addressed.
Topical Ketoconazole Counseling: Patient counseled that this medication may cause skin irritation or allergic reactions.  In the event of skin irritation, the patient was advised to reduce the amount of the drug applied or use it less frequently.   The patient verbalized understanding of the proper use and possible adverse effects of ketoconazole.  All of the patient's questions and concerns were addressed.
Oral Minoxidil Counseling- I discussed with the patient the risks of oral minoxidil including but not limited to shortness of breath, swelling of the feet or ankles, dizziness, lightheadedness, unwanted hair growth and allergic reaction.  The patient verbalized understanding of the proper use and possible adverse effects of oral minoxidil.  All of the patient's questions and concerns were addressed.
Winlevi Counseling:  I discussed with the patient the risks of topical clascoterone including but not limited to erythema, scaling, itching, and stinging. Patient voiced their understanding.
Dutasteride Pregnancy And Lactation Text: This medication is absolutely contraindicated in women, especially during pregnancy and breast feeding. Feminization of male fetuses is possible if taking while pregnant.

## 2022-05-20 ENCOUNTER — RX ONLY (OUTPATIENT)
Age: 22
Setting detail: RX ONLY
End: 2022-05-20

## 2022-05-20 RX ORDER — MINOXIDIL/FINASTERIDE 7 %-0.1 %
SOLUTION, NON-ORAL TOPICAL
Qty: 60 | Refills: 0 | Status: ERX | COMMUNITY
Start: 2022-05-20

## 2022-07-12 ENCOUNTER — APPOINTMENT (RX ONLY)
Dept: URBAN - METROPOLITAN AREA CLINIC 22 | Facility: CLINIC | Age: 22
Setting detail: DERMATOLOGY
End: 2022-07-12

## 2022-07-12 DIAGNOSIS — L65.9 NONSCARRING HAIR LOSS, UNSPECIFIED: ICD-10-CM | Status: STABLE

## 2022-07-12 DIAGNOSIS — L74.51 PRIMARY FOCAL HYPERHIDROSIS: ICD-10-CM | Status: WELL CONTROLLED

## 2022-07-12 PROBLEM — L74.512 PRIMARY FOCAL HYPERHIDROSIS, PALMS: Status: ACTIVE | Noted: 2022-07-12

## 2022-07-12 PROBLEM — L74.513 PRIMARY FOCAL HYPERHIDROSIS, SOLES: Status: ACTIVE | Noted: 2022-07-12

## 2022-07-12 PROCEDURE — 99213 OFFICE O/P EST LOW 20 MIN: CPT

## 2022-07-12 PROCEDURE — ? ADDITIONAL NOTES

## 2022-07-12 PROCEDURE — ? MEDICATION COUNSELING

## 2022-07-12 PROCEDURE — ? PRESCRIPTION

## 2022-07-12 PROCEDURE — ? TREATMENT REGIMEN

## 2022-07-12 PROCEDURE — ? COUNSELING

## 2022-07-12 RX ORDER — MINOXIDIL/FINASTERIDE 7 %-0.1 %
SOLUTION, NON-ORAL TOPICAL QHS
Qty: 60 | Refills: 4 | Status: ERX

## 2022-07-12 RX ORDER — GLYCOPYRROLATE 1 MG/1
TABLET ORAL
Qty: 90 | Refills: 2 | Status: ERX

## 2022-07-12 ASSESSMENT — LOCATION DETAILED DESCRIPTION DERM
LOCATION DETAILED: LEFT ULNAR PALM
LOCATION DETAILED: RIGHT MEDIAL FRONTAL SCALP
LOCATION DETAILED: LEFT SOLE
LOCATION DETAILED: RIGHT ULNAR PALM
LOCATION DETAILED: POSTERIOR MID-PARIETAL SCALP
LOCATION DETAILED: RIGHT SOLE

## 2022-07-12 ASSESSMENT — LOCATION SIMPLE DESCRIPTION DERM
LOCATION SIMPLE: LEFT SOLE
LOCATION SIMPLE: POSTERIOR SCALP
LOCATION SIMPLE: RIGHT HAND
LOCATION SIMPLE: RIGHT SOLE
LOCATION SIMPLE: LEFT HAND
LOCATION SIMPLE: RIGHT SCALP

## 2022-07-12 ASSESSMENT — LOCATION ZONE DERM
LOCATION ZONE: SCALP
LOCATION ZONE: HAND
LOCATION ZONE: FEET

## 2022-07-12 NOTE — PROCEDURE: ADDITIONAL NOTES
Detail Level: Detailed
Render Risk Assessment In Note?: no
Additional Notes: Birth control method is an IUD

## 2022-07-21 ENCOUNTER — HOSPITAL ENCOUNTER (OUTPATIENT)
Dept: LAB | Facility: MEDICAL CENTER | Age: 22
End: 2022-07-21
Attending: PHYSICIAN ASSISTANT
Payer: COMMERCIAL

## 2022-07-21 PROCEDURE — 87491 CHLMYD TRACH DNA AMP PROBE: CPT

## 2022-07-21 PROCEDURE — 87591 N.GONORRHOEAE DNA AMP PROB: CPT

## 2022-07-21 PROCEDURE — 88175 CYTOPATH C/V AUTO FLUID REDO: CPT

## 2022-07-22 LAB
C TRACH DNA GENITAL QL NAA+PROBE: NEGATIVE
CYTOLOGY REG CYTOL: NORMAL
N GONORRHOEA DNA GENITAL QL NAA+PROBE: NEGATIVE
SPECIMEN SOURCE: NORMAL

## 2022-10-07 RX ORDER — GLYCOPYRROLATE 1 MG/1
TABLET ORAL
Qty: 90 | Refills: 2 | Status: ERX

## 2022-10-19 ENCOUNTER — APPOINTMENT (RX ONLY)
Dept: URBAN - METROPOLITAN AREA CLINIC 22 | Facility: CLINIC | Age: 22
Setting detail: DERMATOLOGY
End: 2022-10-19

## 2022-10-19 DIAGNOSIS — L21.8 OTHER SEBORRHEIC DERMATITIS: ICD-10-CM

## 2022-10-19 DIAGNOSIS — L65.9 NONSCARRING HAIR LOSS, UNSPECIFIED: ICD-10-CM

## 2022-10-19 DIAGNOSIS — L663 OTHER SPECIFIED DISEASES OF HAIR AND HAIR FOLLICLES: ICD-10-CM

## 2022-10-19 DIAGNOSIS — L738 OTHER SPECIFIED DISEASES OF HAIR AND HAIR FOLLICLES: ICD-10-CM

## 2022-10-19 DIAGNOSIS — L74.51 PRIMARY FOCAL HYPERHIDROSIS: ICD-10-CM | Status: WELL CONTROLLED

## 2022-10-19 DIAGNOSIS — L73.9 FOLLICULAR DISORDER, UNSPECIFIED: ICD-10-CM

## 2022-10-19 PROBLEM — L02.421 FURUNCLE OF RIGHT AXILLA: Status: ACTIVE | Noted: 2022-10-19

## 2022-10-19 PROBLEM — L74.512 PRIMARY FOCAL HYPERHIDROSIS, PALMS: Status: ACTIVE | Noted: 2022-10-19

## 2022-10-19 PROCEDURE — ? TREATMENT REGIMEN

## 2022-10-19 PROCEDURE — ? PRESCRIPTION

## 2022-10-19 PROCEDURE — 99214 OFFICE O/P EST MOD 30 MIN: CPT

## 2022-10-19 PROCEDURE — ? COUNSELING

## 2022-10-19 RX ORDER — GLYCOPYRROLATE 1 MG/1
TABLET ORAL
Qty: 90 | Refills: 2 | Status: ERX

## 2022-10-19 RX ORDER — KETOCONAZOLE 20 MG/ML
1 SHAMPOO, SUSPENSION TOPICAL EVERY OTHER DAY
Qty: 120 | Refills: 8 | Status: ERX | COMMUNITY
Start: 2022-10-19

## 2022-10-19 RX ORDER — SPIRONOLACTONE 25 MG/1
1 TABLET, FILM COATED ORAL QD
Qty: 60 | Refills: 2 | Status: ERX | COMMUNITY
Start: 2022-10-19

## 2022-10-19 RX ADMIN — SPIRONOLACTONE 1: 25 TABLET, FILM COATED ORAL at 00:00

## 2022-10-19 RX ADMIN — KETOCONAZOLE 1: 20 SHAMPOO, SUSPENSION TOPICAL at 00:00

## 2022-10-19 ASSESSMENT — LOCATION DETAILED DESCRIPTION DERM
LOCATION DETAILED: LEFT SUPERIOR PARIETAL SCALP
LOCATION DETAILED: LEFT ULNAR PALM
LOCATION DETAILED: RIGHT ULNAR PALM
LOCATION DETAILED: RIGHT AXILLARY VAULT
LOCATION DETAILED: POSTERIOR MID-PARIETAL SCALP

## 2022-10-19 ASSESSMENT — LOCATION SIMPLE DESCRIPTION DERM
LOCATION SIMPLE: RIGHT HAND
LOCATION SIMPLE: SCALP
LOCATION SIMPLE: POSTERIOR SCALP
LOCATION SIMPLE: RIGHT AXILLARY VAULT
LOCATION SIMPLE: LEFT HAND

## 2022-10-19 ASSESSMENT — SEVERITY ASSESSMENT: SEVERITY: MILD

## 2022-10-19 ASSESSMENT — LOCATION ZONE DERM
LOCATION ZONE: AXILLAE
LOCATION ZONE: SCALP
LOCATION ZONE: HAND

## 2022-10-19 NOTE — PROCEDURE: COUNSELING
Medical Necessity Information: LCD Guidelines vary from payer to payer. Please check with your payer's policy to determine medical necessity.
Detail Level: Simple
Medical Necessity Clause: Botulinum toxin hyperhidrosis therapy is medically necessary because
Detail Level: Zone
Detail Level: Detailed

## 2022-10-19 NOTE — PROCEDURE: TREATMENT REGIMEN
Detail Level: Simple
Discontinue Regimen: Finapod daily
Initiate Treatment: Spironolactone 25mg daily for 1 week then increase to spironolactone 25 mg twice a day \\nOTC Rogaine daily

## 2022-11-07 ENCOUNTER — TELEPHONE (OUTPATIENT)
Dept: PEDIATRIC HEMATOLOGY/ONCOLOGY | Facility: OUTPATIENT CENTER | Age: 22
End: 2022-11-07
Payer: COMMERCIAL

## 2022-11-07 NOTE — TELEPHONE ENCOUNTER
Emy called to make sure she still had a standing order for labs.  She will be seen at 10a.m. on November 28th and would like to get them done before that.

## 2022-11-10 DIAGNOSIS — Z85.6 HISTORY OF LEUKEMIA: ICD-10-CM

## 2022-11-18 ENCOUNTER — HOSPITAL ENCOUNTER (OUTPATIENT)
Dept: LAB | Facility: MEDICAL CENTER | Age: 22
End: 2022-11-18
Attending: PEDIATRICS
Payer: COMMERCIAL

## 2022-11-18 DIAGNOSIS — Z85.6 HISTORY OF LEUKEMIA: ICD-10-CM

## 2022-11-18 LAB
ALBUMIN SERPL BCP-MCNC: 4.4 G/DL (ref 3.2–4.9)
ALBUMIN/GLOB SERPL: 1.7 G/DL
ALP SERPL-CCNC: 57 U/L (ref 30–99)
ALT SERPL-CCNC: 7 U/L (ref 2–50)
ANION GAP SERPL CALC-SCNC: 10 MMOL/L (ref 7–16)
AST SERPL-CCNC: 15 U/L (ref 12–45)
BASOPHILS # BLD AUTO: 0.2 % (ref 0–1.8)
BASOPHILS # BLD: 0.01 K/UL (ref 0–0.12)
BILIRUB SERPL-MCNC: 0.6 MG/DL (ref 0.1–1.5)
BUN SERPL-MCNC: 9 MG/DL (ref 8–22)
CALCIUM SERPL-MCNC: 9.4 MG/DL (ref 8.5–10.5)
CHLORIDE SERPL-SCNC: 103 MMOL/L (ref 96–112)
CO2 SERPL-SCNC: 24 MMOL/L (ref 20–33)
CREAT SERPL-MCNC: 0.69 MG/DL (ref 0.5–1.4)
EOSINOPHIL # BLD AUTO: 0.14 K/UL (ref 0–0.51)
EOSINOPHIL NFR BLD: 2.2 % (ref 0–6.9)
ERYTHROCYTE [DISTWIDTH] IN BLOOD BY AUTOMATED COUNT: 41.3 FL (ref 35.9–50)
GFR SERPLBLD CREATININE-BSD FMLA CKD-EPI: 125 ML/MIN/1.73 M 2
GLOBULIN SER CALC-MCNC: 2.6 G/DL (ref 1.9–3.5)
GLUCOSE SERPL-MCNC: 82 MG/DL (ref 65–99)
HCT VFR BLD AUTO: 46.7 % (ref 37–47)
HGB BLD-MCNC: 15.4 G/DL (ref 12–16)
IMM GRANULOCYTES # BLD AUTO: 0.01 K/UL (ref 0–0.11)
IMM GRANULOCYTES NFR BLD AUTO: 0.2 % (ref 0–0.9)
LYMPHOCYTES # BLD AUTO: 1.74 K/UL (ref 1–4.8)
LYMPHOCYTES NFR BLD: 27.8 % (ref 22–41)
MCH RBC QN AUTO: 32.7 PG (ref 27–33)
MCHC RBC AUTO-ENTMCNC: 33 G/DL (ref 33.6–35)
MCV RBC AUTO: 99.2 FL (ref 81.4–97.8)
MONOCYTES # BLD AUTO: 0.39 K/UL (ref 0–0.85)
MONOCYTES NFR BLD AUTO: 6.2 % (ref 0–13.4)
NEUTROPHILS # BLD AUTO: 3.97 K/UL (ref 2–7.15)
NEUTROPHILS NFR BLD: 63.4 % (ref 44–72)
NRBC # BLD AUTO: 0 K/UL
NRBC BLD-RTO: 0 /100 WBC
PLATELET # BLD AUTO: 256 K/UL (ref 164–446)
PMV BLD AUTO: 9.8 FL (ref 9–12.9)
POTASSIUM SERPL-SCNC: 4.7 MMOL/L (ref 3.6–5.5)
PROT SERPL-MCNC: 7 G/DL (ref 6–8.2)
RBC # BLD AUTO: 4.71 M/UL (ref 4.2–5.4)
SODIUM SERPL-SCNC: 137 MMOL/L (ref 135–145)
WBC # BLD AUTO: 6.3 K/UL (ref 4.8–10.8)

## 2022-11-18 PROCEDURE — 36415 COLL VENOUS BLD VENIPUNCTURE: CPT

## 2022-11-18 PROCEDURE — 80053 COMPREHEN METABOLIC PANEL: CPT

## 2022-11-18 PROCEDURE — 85025 COMPLETE CBC W/AUTO DIFF WBC: CPT

## 2022-11-28 ENCOUNTER — OFFICE VISIT (OUTPATIENT)
Dept: PEDIATRIC HEMATOLOGY/ONCOLOGY | Facility: OUTPATIENT CENTER | Age: 22
End: 2022-11-28
Payer: COMMERCIAL

## 2022-11-28 VITALS
WEIGHT: 150 LBS | TEMPERATURE: 99.3 F | DIASTOLIC BLOOD PRESSURE: 81 MMHG | HEIGHT: 66 IN | BODY MASS INDEX: 24.11 KG/M2 | HEART RATE: 91 BPM | SYSTOLIC BLOOD PRESSURE: 137 MMHG | OXYGEN SATURATION: 99 %

## 2022-11-28 DIAGNOSIS — Z85.6 HISTORY OF LEUKEMIA: ICD-10-CM

## 2022-11-28 PROCEDURE — 99213 OFFICE O/P EST LOW 20 MIN: CPT | Performed by: PEDIATRICS

## 2022-11-28 RX ORDER — ESCITALOPRAM OXALATE 10 MG/1
20 TABLET ORAL DAILY
COMMUNITY

## 2022-11-28 RX ORDER — KETOCONAZOLE 20 MG/ML
SHAMPOO TOPICAL
COMMUNITY
Start: 2022-10-19

## 2022-11-28 RX ORDER — GLYCOPYRROLATE 1.5 MG/1
1 TABLET ORAL 2 TIMES DAILY
COMMUNITY

## 2022-11-28 RX ORDER — LORAZEPAM 0.5 MG/1
TABLET ORAL
COMMUNITY

## 2022-11-28 RX ORDER — SPIRONOLACTONE 25 MG/1
TABLET ORAL
COMMUNITY

## 2022-11-28 ASSESSMENT — FIBROSIS 4 INDEX: FIB4 SCORE: 0.49

## 2022-11-28 ASSESSMENT — PATIENT HEALTH QUESTIONNAIRE - PHQ9: CLINICAL INTERPRETATION OF PHQ2 SCORE: 0

## 2022-12-08 NOTE — PROGRESS NOTES
Pediatric Hematology/Oncology Clinic  Progress Note      Patient Name:  Emy Francis  : 2000   MRN: [id number]    Location of Service: [facility]    Date of Service: 2022  Time: 10:00 AM    Primary Care Physician: Cadence Morales M.D.    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  female who returns to the [facility] for follow-up of her history of having had Acute Promyelocytic Leukemia. Emy presents for her 75 months of therapy (6 years) follow-ups.  She presents by herself and provides accurate clinical interval history.    Briefly, Emy was otherwise healthy until mid-2015 when she began to develop fatigue and a headache.  She also noticed at the time a bruise on the left side of her breast.  Over the next couple of weeks, the headaches worsened and she began to develop more symptoms including a constant bloody nose and light headedness.  She also began to develop petechiae on her legs, arms and shoulders.  The bruising worsened and she began to develop shortness of breath.  She was brought to her pediatrician's office with gum bleeding, lightheadedness and bloody urin on 11/30/15.  A routine set of labs were obtained, but before they had resulted, Emy was brought to the hospital with increasing shortness of breath.  Dr. Joseph saw her upon admission and informed her that her counts were all low and she would be transferred to [facility] for work-up and treatment.  She was transferred on 12/1/15 and a leukemia work-up was started.  A bone marrow evaluation at [facility] was remarkable for acute promyelocytic leukemia.  Her initial lumbar puncture was negative for CSF disease.  Flow cytometry was strongly positive  for CD13 and CD33; mild positivity for HLA-DR and CD34.  FISH for t(15,17) was confirmed.  Pre-treatment with all-trans retinoic acid (ATRA) was initiated prior to confirmatory results on 12/3/15.  Initial supportive care also included PRBC transfusion.  Work-up was complicated by what Emy describes as a spinal headache which lasted the entire first moth of treatment.  Following confirmatory results, Emy was consented for and enrolled on [facility] [id number]. Induction with arsenic trioxide and ATRA was started started 12/5/15 and completed 1/4/16.  Induction was not only complicated by a persistent headache, but Emy also experienced rising white blood cell count requiring the initiation of hydroxyurea and decadron on 12/15/15 and ending on 12/24/15 when WBC finally dropped below 10,000.  Persistent headaches forced ATRA to be held 12/25/15 and restarted on 12/26/15 at 75% dosing.  Although there was breif improvement, the following week, 1/4/16 Emy developed double vision and esotropia in her right eye.  Ophthalmologic examination was remarkable for papilledema and ATRA again was held.  Ultimately she would be treated with Diamox for pseudotumor cerebri resulting from ATRA therapy.  I  Induction was also complicated by coagulopathy mild coagulopathy, spinal headaches, nausea/vomitting, plantar palmar erythrodysesthesia, capillary leak syndrome, skin hypersensitivity and steroid intolerance. Post-Induction evaluation was unremarkable for residual disease.  Consolidation was scheduled for start of 1/18/16, but was delayed two weeks due to myelosuppression and started 2/1/16.  The remainder of Consolidation Cycle 1 was unremarkable and primarily therapy was given in [place].  Consolidation Cycle 2 was started as scheduled 3/28/16 and Emy's end of Cycle 2 bone marrow evaluation was unremarkable for any MRD (PML-RAR?).  Consolidation Cycles 3 and 4 were unremarkable and therapy was completed 8/17/16.   Emy was seen in follow-up for the first 5 months off therapy by Dr. Joseph in [place].  During this period of time she was weaned from her Diamox and there have been no further complications of her therapy.    Her off therapy care was then transferred to [facility].  Follow-up reporting has continued to date for her study.     Emy presents today in good clinical health.  Her last visit was 11/29/2021 at which point there were no significant concerns clinically.  She did complain of some learning disability and trouble with memory but otherwise did not have significant complaints.  She reports that interval history is remarkable for a trip to the urgent care in January for a concussion after hitting her head.  She reports also that she had a reaction to her tuberculosis screening test.  In addition to those 2 events, she also has been diagnosed with panic attacks.  She reports that she has been prescribed medical marijuana which is helping with panic attacks.  She continues with Lexapro 10 mg daily in addition to using Ativan as needed.  She reports that the medical marijuana has been the most helpful in controlling her panic attacks.  She also reports that her constipation is improved as she was diagnosed with H. pylori and ultimately treated with 2 weeks of antibiotics which has resolved her constipation as well.  She continues to take glycopyrrolate for sweaty palms and has also started with Rogaine for thinning hair.  She otherwise denies any significant concerns or complications.  She reports that she has been overall healthy without any recent or remote illness.  No complaints of any fevers, night sweats or significant unexpected weight loss.  She reports that her energy and activity are at a good baseline.  Certainly this has improved from the past where she had significant fatigue.  She denies any joint pain.  No complaints of any nausea, vomiting, diarrhea or constipation anymore.  No rashes  or concerning skin changes with the exception of reaction to TB test.  She reports that she continues to have some minor difficulties with learning and comprehension but overall doing well.  Having normal periods since IUD placed several years ago.  No other concerns or complaints at this time.    Review of Systems:     Constitutional: Afebrile.  Without recent illness.  Energy and activity are good.   HENT: Negative.  Eyes: Negative for visual changes.  Respiratory: Negative for shortness of breath.  Cardiovascular: Negative.  Gastrointestinal: Negative for nausea, vomiting, abdominal pain, diarrhea, constipation.  Genitourinary: Negative.  Musculoskeletal: Negative for joint or muscle pains.    Skin: Negative for rash, signs of infection.  Neurological: Negative for numbness, tingling, sensory changes, weakness or headaches.    Endo/Heme/Allergies: Does not bruise/bleed easily.    Psychiatric/Behavioral: No changes in mood, appropriate for age.     PAST MEDICAL HISTORY:     Past Medical History:     1) Previously health, only one episode of otitis media as a child  2) Acute Promyelocytic Leukemia (APML) diagnosis  3) No hospitalizations or surgeries prior to diagnosis of APML  4) Seasonal allergies  5) Obesity (RESOLVED)  6) Ovarian Cyst  7) Disordered Eating  8) Panic attacks  9) Depression/Anxiety  10) Hyperhidrosis  11) Thinning hair       Past Surgical History:      1) Therapy related bone marrow aspiration, lumbar punctures  2) Port-a-cath placement and removal     Birth/Developmental History:     1st of 2 children  No complications of pregnancy, good prenatal care  Delivered at 39 weeks EGA, no complications of delivery, however transferred to the NICU for temperature of 103F - stayed for 10 days  No further complications  Never any concerns for growth and development  Has always met milestones      Menstrual History:  No menses, IUD currently in place.     Allergies:         Allergies as of 08/15/2018     "(No Known Allergies)      Social History:   Lives with susanna.  Attending school.     Family History:      1) Maternal grandfather with Stage IV gastric Ca.  2) Paternal grandfather with \"pre-kidney cancer\"  3) Cousin with lupus  4) Family history of HTN  5) No childhood cancers, no childhood unexpected deaths or illness, no rheumatologicc disease, bleeding or clotting disorders.     Immunizations:  Up to date.      Medications:   Current Outpatient Medications on File Prior to Visit   Medication Sig Dispense Refill    Glycopyrrolate 1.5 MG Tab Take 1 Tablet by mouth 2 times a day.      escitalopram (LEXAPRO) 10 MG Tab       ketoconazole (NIZORAL) 2 % shampoo       LORazepam (ATIVAN) 0.5 MG Tab       spironolactone (ALDACTONE) 25 MG Tab        No current facility-administered medications on file prior to visit.     Medical Marijuana    OBJECTIVE:     Vitals:   /81 (BP Location: Right arm, Patient Position: Sitting, BP Cuff Size: Adult)   Pulse 91   Temp 37.4 °C (99.3 °F) (Temporal)   Ht 1.676 m (5' 6\")   Wt 68 kg (150 lb)   SpO2 99%     Labs:    No visits with results within 2 Day(s) from this visit.   Latest known visit with results is:   Hospital Outpatient Visit on 11/18/2022   Component Date Value    WBC 11/18/2022 6.3     RBC 11/18/2022 4.71     Hemoglobin 11/18/2022 15.4     Hematocrit 11/18/2022 46.7     MCV 11/18/2022 99.2 (H)     MCH 11/18/2022 32.7     MCHC 11/18/2022 33.0 (L)     RDW 11/18/2022 41.3     Platelet Count 11/18/2022 256     MPV 11/18/2022 9.8     Neutrophils-Polys 11/18/2022 63.40     Lymphocytes 11/18/2022 27.80     Monocytes 11/18/2022 6.20     Eosinophils 11/18/2022 2.20     Basophils 11/18/2022 0.20     Immature Granulocytes 11/18/2022 0.20     Nucleated RBC 11/18/2022 0.00     Neutrophils (Absolute) 11/18/2022 3.97     Lymphs (Absolute) 11/18/2022 1.74     Monos (Absolute) 11/18/2022 0.39     Eos (Absolute) 11/18/2022 0.14     Baso (Absolute) 11/18/2022 0.01     Immature " Granulocytes (a* 11/18/2022 0.01     NRBC (Absolute) 11/18/2022 0.00     Sodium 11/18/2022 137     Potassium 11/18/2022 4.7     Chloride 11/18/2022 103     Co2 11/18/2022 24     Anion Gap 11/18/2022 10.0     Glucose 11/18/2022 82     Bun 11/18/2022 9     Creatinine 11/18/2022 0.69     Calcium 11/18/2022 9.4     AST(SGOT) 11/18/2022 15     ALT(SGPT) 11/18/2022 7     Alkaline Phosphatase 11/18/2022 57     Total Bilirubin 11/18/2022 0.6     Albumin 11/18/2022 4.4     Total Protein 11/18/2022 7.0     Globulin 11/18/2022 2.6     A-G Ratio 11/18/2022 1.7     GFR (CKD-EPI) 11/18/2022 125      Physical Exam:    Constitutional: Well-developed, well-nourished, and in no distress.  Very well-appearing.  HENT: Normocephalic and atraumatic. No nasal congestion or rhinorrhea. Oropharynx is clear and moist. No oral ulcerations or sores.    Eyes: Conjunctivae are normal. Pupils are equal, round.  EOMI.  Nonicteric.  Neck: Normal range of motion of neck, no adenopathy.    Cardiovascular: Normal rate, regular rhythm and normal heart sounds.  No murmur heard. DP/radial pulses 2+, cap refill < 2 sec.  Pulmonary/Chest: Effort normal and breath sounds normal. No respiratory distress. Symmetric expansion.  No crackles or wheezes.  Abdomen: Soft. Bowel sounds are normal. No distension and no mass. There is no hepatosplenomegaly.    Genitourinary:  Deferred  Musculoskeletal: Normal range of motion of lower and upper extremities bilaterally.  Neurological: Alert and oriented to person and place. Exhibits normal muscle tone bilaterally in upper and lower extremities. Gait normal. Coordination normal.    Skin: Skin is warm, dry and pink.  No rash or evidence of skin infection.  No pallor.   Psychiatric: Mood and affect normal for age.    ASSESSMENT AND PLAN:     Emy Francis is a 22 year old previously healthy female with a history of APML, treated on [id number] for 75 months (6 years ) off therapy follow-up.     1) Acute Promyelocytic  Leukemia, History:              - Completed therapy ON STUDY [id number], now 75 months off therapy              - No clinical evidence of disease, no laboratory evidence of disease              - WBC 6.3, Hgb 15.4, MCV 99.2, platelets 256   - ANC 3970, ALC 1740              - No physical evidence of disease              - Will RTC 12 months - continue data submission for on study [id number](OS)     2) Panic Attacks:   - Previously unrecognized   - With recognition and treatment (medical marijuana) improved greatly    3) History of Hypertension:              - Blood pressure elevated 137/87 mm Hg.  Still mild to moderately elevated however improved from previous.              - Per personal report, is seen across a number of disciplines and all other disciplines have normal blood pressures recorded.     4) Disordered Eating (IMPROVED/RESOLVED):              - Previously obese at end of therapy, with significant symptoms related to obesity              - Starting in 2019 with rapid weight loss and elevated MCV concerning for disordered eating              - Findings were discussed with patient at that time              - Diagnosed with disordered eating, was on treatment     5) Joint Pains (RESOLVED):            6) History of Vitamin D Deficiency:              - No vitamin D obtained today, prior levels have been near normal, encouraged vitamin D and calcium supplementation     7) Survivorship/Late Effects Monitoring:              - Cognitive: No recent neurocognitive testing.  Has had difficulty with memory and concentration thought to be secondary to therapy.              - Psychosocial:  No reports of behavioral issues.              - Renal:  Blood pressure slightly better than previous clinic visits. Creatinine 0.69 mg/dL which is at baseline.  Normal electrolytes, no concerns at this time for renal disease.              - Cardiac:  ECHO yearly to 10 years.  Did not obtain echocardiogram in the past year.  Should be  obtaining yearly echocardiograms, will obtain echocardiogram this year.              - Pulmonary:  No chest/mediastinal radiation.  Clinical exam unremarkable.              - Musckuloskeletal:  High dose steroid exposure during therapy.  History of low vitamin D levels, continue on Vitamin D - Calcium supplementation with daily chews.  No vitamin D level today.              - Growth and Development: No longer obtaining weights with every visit.              - Reproductive:  IUD in place. Anti-müllerian hormone level obtained on 11/3/2021- 2.052 ng/mL (baseline, WNL) -we will obtain again with next year's labs              - Performance:  Karnofsky 100 / ECOG 1     Follow up for COG [id number]:       End of Therapy Follow-up Relapse   Physical Exam with VS X Monthly to 12 months  Q3 months to 36 months  Q6 months to 48 months  Yearly to 10 years X   Ht, Wt, BSA                          X   Performance Status X   X   CBC, Diff, Plts X Monthly to 12 months  Q3 months to 36 months  Q6 months to 48 months  Yearly to 10 years X   Electrolytes (Ca++, Mg++, K+) and Creatinine     X   Uric Acid     X   ECHO   X Yearly to 10 years X   BMA X   X         Disposition:  Return to clinic in 12 months for 87 month off therapy evaluation, PE, and labs      Heriberto Hylton MD  Pediatric Hematology / Oncology  [facility]  Cell.  [phone]  Office. [phone]

## 2023-01-19 ENCOUNTER — APPOINTMENT (RX ONLY)
Dept: URBAN - METROPOLITAN AREA CLINIC 22 | Facility: CLINIC | Age: 23
Setting detail: DERMATOLOGY
End: 2023-01-19

## 2023-01-19 DIAGNOSIS — L74.51 PRIMARY FOCAL HYPERHIDROSIS: ICD-10-CM | Status: WELL CONTROLLED

## 2023-01-19 DIAGNOSIS — D22 MELANOCYTIC NEVI: ICD-10-CM

## 2023-01-19 DIAGNOSIS — L65.9 NONSCARRING HAIR LOSS, UNSPECIFIED: ICD-10-CM | Status: IMPROVED

## 2023-01-19 PROBLEM — L74.512 PRIMARY FOCAL HYPERHIDROSIS, PALMS: Status: ACTIVE | Noted: 2023-01-19

## 2023-01-19 PROBLEM — D22.21 MELANOCYTIC NEVI OF RIGHT EAR AND EXTERNAL AURICULAR CANAL: Status: ACTIVE | Noted: 2023-01-19

## 2023-01-19 PROCEDURE — ? PRESCRIPTION

## 2023-01-19 PROCEDURE — ? TREATMENT REGIMEN

## 2023-01-19 PROCEDURE — ? DIAGNOSIS COMMENT

## 2023-01-19 PROCEDURE — 99213 OFFICE O/P EST LOW 20 MIN: CPT

## 2023-01-19 PROCEDURE — ? OBSERVATION AND MEASURE

## 2023-01-19 PROCEDURE — ? COUNSELING

## 2023-01-19 RX ORDER — GLYCOPYRROLATE 1 MG/1
TABLET ORAL
Qty: 90 | Refills: 5 | Status: ERX

## 2023-01-19 RX ORDER — SPIRONOLACTONE 25 MG/1
1 TABLET, FILM COATED ORAL QD
Qty: 60 | Refills: 5 | Status: ERX

## 2023-01-19 ASSESSMENT — LOCATION DETAILED DESCRIPTION DERM
LOCATION DETAILED: LEFT ULNAR PALM
LOCATION DETAILED: LEFT SUPERIOR PARIETAL SCALP
LOCATION DETAILED: RIGHT POSTERIOR EAR
LOCATION DETAILED: RIGHT ULNAR PALM

## 2023-01-19 ASSESSMENT — LOCATION ZONE DERM
LOCATION ZONE: HAND
LOCATION ZONE: SCALP
LOCATION ZONE: EAR

## 2023-01-19 ASSESSMENT — LOCATION SIMPLE DESCRIPTION DERM
LOCATION SIMPLE: RIGHT HAND
LOCATION SIMPLE: RIGHT EAR
LOCATION SIMPLE: LEFT HAND
LOCATION SIMPLE: SCALP

## 2023-01-19 NOTE — PROCEDURE: MIPS QUALITY
162.56
Quality 130: Documentation Of Current Medications In The Medical Record: Current Medications Documented
Detail Level: Detailed
Quality 226: Preventive Care And Screening: Tobacco Use: Screening And Cessation Intervention: Patient screened for tobacco use and is an ex/non-smoker

## 2023-01-19 NOTE — PROCEDURE: TREATMENT REGIMEN
Detail Level: Simple
Continue Regimen: Spironolactone 25mg twice a day \\nOTC Rogaine daily
Continue Regimen: glycopyrrolate 3mg daily

## 2023-01-19 NOTE — PROCEDURE: COUNSELING
Medical Necessity Information: LCD Guidelines vary from payer to payer. Please check with your payer's policy to determine medical necessity.
Detail Level: Simple
Medical Necessity Clause: Botulinum toxin hyperhidrosis therapy is medically necessary because
Detail Level: Detailed

## 2023-02-24 NOTE — TELEPHONE ENCOUNTER
Call from Emy requesting a CBC order so that she can go get her labs drawn.     Will route over to the MD.    Called Diamond with [employer] back.  Left message for callback at her convenience.  Office number provided.    Called patient back. Her employer needs her FMLA papers back by Friday. I let her know we did receive them and will have them filled out and sent back today or tomorrow. All questions answered at this time.   Detail Level: Detailed Person calling (if not the patient, is medical info able to be given?): [employer]    Callback phone number:[phone] ext [phone]      Detailed reason for call: Diamond from [employer] called requesting to speak to someone from Dr. Shaw's team. Diamond stated they can complete FMLA questionnaire through the phone to expedite request.     Person calling (if not the patient, is medical info able to be given?): Tressa Agudelo    Callback phone number: [phone]     Detailed reason for call: Patient is requesting a call back with update on disability paperwork from [employer].    Add 40888 Cpt? (Important Note: In 2017 The Use Of 38588 Is Being Tracked By Cms To Determine Future Global Period Reimbursement For Global Periods): no

## 2023-04-07 ENCOUNTER — HOSPITAL ENCOUNTER (OUTPATIENT)
Dept: CARDIOLOGY | Facility: MEDICAL CENTER | Age: 23
End: 2023-04-07
Attending: PEDIATRICS
Payer: COMMERCIAL

## 2023-04-07 DIAGNOSIS — Z85.6 HISTORY OF LEUKEMIA: ICD-10-CM

## 2023-04-07 LAB
LV EJECT FRACT  99904: 55
LV EJECT FRACT MOD 2C 99903: 50.9
LV EJECT FRACT MOD 4C 99902: 53.92
LV EJECT FRACT MOD BP 99901: 53.12

## 2023-04-07 PROCEDURE — 93306 TTE W/DOPPLER COMPLETE: CPT

## 2023-04-07 PROCEDURE — 93306 TTE W/DOPPLER COMPLETE: CPT | Mod: 26 | Performed by: INTERNAL MEDICINE

## 2023-05-24 ENCOUNTER — APPOINTMENT (RX ONLY)
Dept: URBAN - METROPOLITAN AREA CLINIC 22 | Facility: CLINIC | Age: 23
Setting detail: DERMATOLOGY
End: 2023-05-24

## 2023-05-24 DIAGNOSIS — L70.0 ACNE VULGARIS: ICD-10-CM | Status: WELL CONTROLLED

## 2023-05-24 DIAGNOSIS — L74.51 PRIMARY FOCAL HYPERHIDROSIS: ICD-10-CM | Status: INADEQUATELY CONTROLLED

## 2023-05-24 DIAGNOSIS — L65.9 NONSCARRING HAIR LOSS, UNSPECIFIED: ICD-10-CM | Status: STABLE

## 2023-05-24 PROBLEM — L74.512 PRIMARY FOCAL HYPERHIDROSIS, PALMS: Status: ACTIVE | Noted: 2023-05-24

## 2023-05-24 PROCEDURE — ? PRESCRIPTION

## 2023-05-24 PROCEDURE — 99214 OFFICE O/P EST MOD 30 MIN: CPT

## 2023-05-24 PROCEDURE — ? TREATMENT REGIMEN

## 2023-05-24 PROCEDURE — ? DIAGNOSIS COMMENT

## 2023-05-24 PROCEDURE — ? COUNSELING

## 2023-05-24 PROCEDURE — ? ADDITIONAL NOTES

## 2023-05-24 RX ORDER — ALUMINUM CHLORIDE 20 %
1 SOLUTION, NON-ORAL TOPICAL QHS
Qty: 60 | Refills: 3 | Status: ERX | COMMUNITY
Start: 2023-05-24

## 2023-05-24 RX ADMIN — Medication 1: at 00:00

## 2023-05-24 ASSESSMENT — LOCATION SIMPLE DESCRIPTION DERM
LOCATION SIMPLE: SCALP
LOCATION SIMPLE: RIGHT CHEEK
LOCATION SIMPLE: LEFT HAND
LOCATION SIMPLE: RIGHT HAND
LOCATION SIMPLE: LEFT CHEEK
LOCATION SIMPLE: SUPERIOR FOREHEAD

## 2023-05-24 ASSESSMENT — LOCATION DETAILED DESCRIPTION DERM
LOCATION DETAILED: RIGHT INFERIOR CENTRAL MALAR CHEEK
LOCATION DETAILED: LEFT ULNAR PALM
LOCATION DETAILED: SUPERIOR MID FOREHEAD
LOCATION DETAILED: LEFT SUPERIOR PARIETAL SCALP
LOCATION DETAILED: LEFT INFERIOR CENTRAL MALAR CHEEK
LOCATION DETAILED: RIGHT ULNAR PALM

## 2023-05-24 ASSESSMENT — LOCATION ZONE DERM
LOCATION ZONE: HAND
LOCATION ZONE: FACE
LOCATION ZONE: SCALP

## 2023-05-24 NOTE — PROCEDURE: TREATMENT REGIMEN
Continue Regimen: glycopyrrolate 3mg daily
Detail Level: Simple
Continue Regimen: OTC Rogaine daily\\nKetoconazole shampoo 2-3x a week
Hide Aquaphor Products: No
Action 2: Continue
Continue Regimen: Spironolactone 25mg twice a day

## 2023-05-24 NOTE — PROCEDURE: COUNSELING
Medical Necessity Information: LCD Guidelines vary from payer to payer. Please check with your payer's policy to determine medical necessity.
Detail Level: Simple
Medical Necessity Clause: Botulinum toxin hyperhidrosis therapy is medically necessary because
Spironolactone Pregnancy And Lactation Text: This medication can cause feminization of the male fetus and should be avoided during pregnancy. The active metabolite is also found in breast milk.
Benzoyl Peroxide Counseling: Patient counseled that medicine may cause skin irritation and bleach clothing.  In the event of skin irritation, the patient was advised to reduce the amount of the drug applied or use it less frequently.   The patient verbalized understanding of the proper use and possible adverse effects of benzoyl peroxide.  All of the patient's questions and concerns were addressed.
Include Pregnancy/Lactation Warning?: No
Topical Clindamycin Pregnancy And Lactation Text: This medication is Pregnancy Category B and is considered safe during pregnancy. It is unknown if it is excreted in breast milk.
Birth Control Pills Pregnancy And Lactation Text: This medication should be avoided if pregnant and for the first 30 days post-partum.
Sarecycline Pregnancy And Lactation Text: This medication is Pregnancy Category D and not consider safe during pregnancy. It is also excreted in breast milk.
Isotretinoin Counseling: Patient should get monthly blood tests, not donate blood, not drive at night if vision affected, not share medication, and not undergo elective surgery for 6 months after tx completed. Side effects reviewed, pt to contact office should one occur.
Bactrim Pregnancy And Lactation Text: This medication is Pregnancy Category D and is known to cause fetal risk.  It is also excreted in breast milk.
Azelaic Acid Counseling: Patient counseled that medicine may cause skin irritation and to avoid applying near the eyes.  In the event of skin irritation, the patient was advised to reduce the amount of the drug applied or use it less frequently.   The patient verbalized understanding of the proper use and possible adverse effects of azelaic acid.  All of the patient's questions and concerns were addressed.
Tazorac Pregnancy And Lactation Text: This medication is not safe during pregnancy. It is unknown if this medication is excreted in breast milk.
Erythromycin Counseling:  I discussed with the patient the risks of erythromycin including but not limited to GI upset, allergic reaction, drug rash, diarrhea, increase in liver enzymes, and yeast infections.
Aklief counseling:  Patient advised to apply a pea-sized amount only at bedtime and wait 30 minutes after washing their face before applying.  If too drying, patient may add a non-comedogenic moisturizer.  The most commonly reported side effects including irritation, redness, scaling, dryness, stinging, burning, itching, and increased risk of sunburn.  The patient verbalized understanding of the proper use and possible adverse effects of retinoids.  All of the patient's questions and concerns were addressed.
Topical Retinoid Pregnancy And Lactation Text: This medication is Pregnancy Category C. It is unknown if this medication is excreted in breast milk.
Winlevi Counseling:  I discussed with the patient the risks of topical clascoterone including but not limited to erythema, scaling, itching, and stinging. Patient voiced their understanding.
Azithromycin Pregnancy And Lactation Text: This medication is considered safe during pregnancy and is also secreted in breast milk.
Doxycycline Counseling:  Patient counseled regarding possible photosensitivity and increased risk for sunburn.  Patient instructed to avoid sunlight, if possible.  When exposed to sunlight, patients should wear protective clothing, sunglasses, and sunscreen.  The patient was instructed to call the office immediately if the following severe adverse effects occur:  hearing changes, easy bruising/bleeding, severe headache, or vision changes.  The patient verbalized understanding of the proper use and possible adverse effects of doxycycline.  All of the patient's questions and concerns were addressed.
Topical Sulfur Applications Counseling: Topical Sulfur Counseling: Patient counseled that this medication may cause skin irritation or allergic reactions.  In the event of skin irritation, the patient was advised to reduce the amount of the drug applied or use it less frequently.   The patient verbalized understanding of the proper use and possible adverse effects of topical sulfur application.  All of the patient's questions and concerns were addressed.
High Dose Vitamin A Pregnancy And Lactation Text: High dose vitamin A therapy is contraindicated during pregnancy and breast feeding.
Tetracycline Counseling: Patient counseled regarding possible photosensitivity and increased risk for sunburn.  Patient instructed to avoid sunlight, if possible.  When exposed to sunlight, patients should wear protective clothing, sunglasses, and sunscreen.  The patient was instructed to call the office immediately if the following severe adverse effects occur:  hearing changes, easy bruising/bleeding, severe headache, or vision changes.  The patient verbalized understanding of the proper use and possible adverse effects of tetracycline.  All of the patient's questions and concerns were addressed. Patient understands to avoid pregnancy while on therapy due to potential birth defects.
Benzoyl Peroxide Pregnancy And Lactation Text: This medication is Pregnancy Category C. It is unknown if benzoyl peroxide is excreted in breast milk.
Spironolactone Counseling: Patient advised regarding risks of diarrhea, abdominal pain, hyperkalemia, birth defects (for female patients), liver toxicity and renal toxicity. The patient may need blood work to monitor liver and kidney function and potassium levels while on therapy. The patient verbalized understanding of the proper use and possible adverse effects of spironolactone.  All of the patient's questions and concerns were addressed.
Dapsone Counseling: I discussed with the patient the risks of dapsone including but not limited to hemolytic anemia, agranulocytosis, rashes, methemoglobinemia, kidney failure, peripheral neuropathy, headaches, GI upset, and liver toxicity.  Patients who start dapsone require monitoring including baseline LFTs and weekly CBCs for the first month, then every month thereafter.  The patient verbalized understanding of the proper use and possible adverse effects of dapsone.  All of the patient's questions and concerns were addressed.
Isotretinoin Pregnancy And Lactation Text: This medication is Pregnancy Category X and is considered extremely dangerous during pregnancy. It is unknown if it is excreted in breast milk.
Topical Clindamycin Counseling: Patient counseled that this medication may cause skin irritation or allergic reactions.  In the event of skin irritation, the patient was advised to reduce the amount of the drug applied or use it less frequently.   The patient verbalized understanding of the proper use and possible adverse effects of clindamycin.  All of the patient's questions and concerns were addressed.
Azelaic Acid Pregnancy And Lactation Text: This medication is considered safe during pregnancy and breast feeding.
Aklief Pregnancy And Lactation Text: It is unknown if this medication is safe to use during pregnancy.  It is unknown if this medication is excreted in breast milk.  Breastfeeding women should use the topical cream on the smallest area of the skin for the shortest time needed while breastfeeding.  Do not apply to nipple and areola.
Birth Control Pills Counseling: Birth Control Pill Counseling: I discussed with the patient the potential side effects of OCPs including but not limited to increased risk of stroke, heart attack, thrombophlebitis, deep venous thrombosis, hepatic adenomas, breast changes, GI upset, headaches, and depression.  The patient verbalized understanding of the proper use and possible adverse effects of OCPs. All of the patient's questions and concerns were addressed.
Erythromycin Pregnancy And Lactation Text: This medication is Pregnancy Category B and is considered safe during pregnancy. It is also excreted in breast milk.
Sarecycline Counseling: Patient advised regarding possible photosensitivity and discoloration of the teeth, skin, lips, tongue and gums.  Patient instructed to avoid sunlight, if possible.  When exposed to sunlight, patients should wear protective clothing, sunglasses, and sunscreen.  The patient was instructed to call the office immediately if the following severe adverse effects occur:  hearing changes, easy bruising/bleeding, severe headache, or vision changes.  The patient verbalized understanding of the proper use and possible adverse effects of sarecycline.  All of the patient's questions and concerns were addressed.
Bactrim Counseling:  I discussed with the patient the risks of sulfa antibiotics including but not limited to GI upset, allergic reaction, drug rash, diarrhea, dizziness, photosensitivity, and yeast infections.  Rarely, more serious reactions can occur including but not limited to aplastic anemia, agranulocytosis, methemoglobinemia, blood dyscrasias, liver or kidney failure, lung infiltrates or desquamative/blistering drug rashes.
Tazorac Counseling:  Patient advised that medication is irritating and drying.  Patient may need to apply sparingly and wash off after an hour before eventually leaving it on overnight.  The patient verbalized understanding of the proper use and possible adverse effects of tazorac.  All of the patient's questions and concerns were addressed.
Doxycycline Pregnancy And Lactation Text: This medication is Pregnancy Category D and not consider safe during pregnancy. It is also excreted in breast milk but is considered safe for shorter treatment courses.
Winlevi Pregnancy And Lactation Text: This medication is considered safe during pregnancy and breastfeeding.
Topical Sulfur Applications Pregnancy And Lactation Text: This medication is Pregnancy Category C and has an unknown safety profile during pregnancy. It is unknown if this topical medication is excreted in breast milk.
Minocycline Counseling: Patient advised regarding possible photosensitivity and discoloration of the teeth, skin, lips, tongue and gums.  Patient instructed to avoid sunlight, if possible.  When exposed to sunlight, patients should wear protective clothing, sunglasses, and sunscreen.  The patient was instructed to call the office immediately if the following severe adverse effects occur:  hearing changes, easy bruising/bleeding, severe headache, or vision changes.  The patient verbalized understanding of the proper use and possible adverse effects of minocycline.  All of the patient's questions and concerns were addressed.
Topical Retinoid counseling:  Patient advised to apply a pea-sized amount only at bedtime and wait 30 minutes after washing their face before applying.  If too drying, patient may add a non-comedogenic moisturizer. The patient verbalized understanding of the proper use and possible adverse effects of retinoids.  All of the patient's questions and concerns were addressed.
High Dose Vitamin A Counseling: Side effects reviewed, pt to contact office should one occur.
Azithromycin Counseling:  I discussed with the patient the risks of azithromycin including but not limited to GI upset, allergic reaction, drug rash, diarrhea, and yeast infections.
Dapsone Pregnancy And Lactation Text: This medication is Pregnancy Category C and is not considered safe during pregnancy or breast feeding.

## 2023-05-24 NOTE — PROCEDURE: ADDITIONAL NOTES
Render Risk Assessment In Note?: no
Detail Level: Detailed
Additional Notes: Having irregular menstrual cycle, will follow up with ob gyn to see if due to Spironolactone

## 2023-08-22 ENCOUNTER — RX ONLY (OUTPATIENT)
Age: 23
Setting detail: RX ONLY
End: 2023-08-22

## 2023-08-22 ENCOUNTER — APPOINTMENT (RX ONLY)
Dept: URBAN - METROPOLITAN AREA CLINIC 22 | Facility: CLINIC | Age: 23
Setting detail: DERMATOLOGY
End: 2023-08-22

## 2023-08-22 DIAGNOSIS — L65.9 NONSCARRING HAIR LOSS, UNSPECIFIED: ICD-10-CM | Status: STABLE

## 2023-08-22 DIAGNOSIS — L70.0 ACNE VULGARIS: ICD-10-CM | Status: INADEQUATELY CONTROLLED

## 2023-08-22 DIAGNOSIS — L74.51 PRIMARY FOCAL HYPERHIDROSIS: ICD-10-CM | Status: INADEQUATELY CONTROLLED

## 2023-08-22 PROBLEM — L74.512 PRIMARY FOCAL HYPERHIDROSIS, PALMS: Status: ACTIVE | Noted: 2023-08-22

## 2023-08-22 PROCEDURE — ? ADDITIONAL NOTES

## 2023-08-22 PROCEDURE — ? COUNSELING

## 2023-08-22 PROCEDURE — ? IONTOPHORESIS

## 2023-08-22 PROCEDURE — ? PRESCRIPTION

## 2023-08-22 PROCEDURE — ? TREATMENT REGIMEN

## 2023-08-22 PROCEDURE — 99214 OFFICE O/P EST MOD 30 MIN: CPT | Mod: 25

## 2023-08-22 PROCEDURE — 97033 APP MDLTY 1+IONTPHRSIS EA 15: CPT

## 2023-08-22 RX ORDER — SPIRONOLACTONE 25 MG/1
1 TABLET, FILM COATED ORAL QD
Qty: 60 | Refills: 5 | Status: ERX

## 2023-08-22 RX ORDER — GLYCOPYRROLATE 1 MG/1
TABLET ORAL
Qty: 90 | Refills: 5 | Status: ERX

## 2023-08-22 RX ORDER — TRETIONIN 0.25 MG/G
1 CREAM TOPICAL QHS
Qty: 45 | Refills: 3 | Status: ERX | COMMUNITY
Start: 2023-08-22

## 2023-08-22 RX ADMIN — TRETIONIN 1: 0.25 CREAM TOPICAL at 00:00

## 2023-08-22 ASSESSMENT — LOCATION SIMPLE DESCRIPTION DERM
LOCATION SIMPLE: RIGHT HAND
LOCATION SIMPLE: RIGHT CHEEK
LOCATION SIMPLE: SCALP
LOCATION SIMPLE: LEFT HAND
LOCATION SIMPLE: SUPERIOR FOREHEAD
LOCATION SIMPLE: LEFT CHEEK

## 2023-08-22 ASSESSMENT — LOCATION DETAILED DESCRIPTION DERM
LOCATION DETAILED: LEFT ULNAR PALM
LOCATION DETAILED: RIGHT ULNAR PALM
LOCATION DETAILED: LEFT INFERIOR CENTRAL MALAR CHEEK
LOCATION DETAILED: RIGHT RADIAL PALM
LOCATION DETAILED: SUPERIOR MID FOREHEAD
LOCATION DETAILED: RIGHT INFERIOR CENTRAL MALAR CHEEK
LOCATION DETAILED: LEFT SUPERIOR PARIETAL SCALP
LOCATION DETAILED: LEFT THENAR EMINENCE

## 2023-08-22 ASSESSMENT — LOCATION ZONE DERM
LOCATION ZONE: FACE
LOCATION ZONE: SCALP
LOCATION ZONE: HAND

## 2023-08-22 ASSESSMENT — SEVERITY ASSESSMENT OVERALL AMONG ALL PATIENTS
IN YOUR EXPERIENCE, AMONG ALL PATIENTS YOU HAVE SEEN WITH THIS CONDITION, HOW SEVERE IS THIS PATIENT'S CONDITION?: FEW INFLAMMATORY LESIONS, SOME NONINFLAMMATORY

## 2023-08-22 NOTE — PROCEDURE: IONTOPHORESIS
Detail Level: Zone
Treatment #: 0
Bill For [id number]?: No
Treatment Interval: bi-weekly
47354 Units Billed: 1
Iontophoresis Treatment Protocol Text: The patient would like iontophoresis for hands
Consent obtained. The risks and benefits of iontophoresis was discussed in detail. Specifically, the risks of local irritation and inadequate treatment were discussed. The treatment sites were clearly identified and confirmed by the patient. Informed patient device may not be covered by her health plan coverage.
Post-Care Instructions: The patient was instructed to call the office if skin irritation persists.

## 2023-08-22 NOTE — PROCEDURE: TREATMENT REGIMEN
Discontinue Regimen: Dry sol due to irritation
Continue Regimen: glycopyrrolate 3mg daily
Detail Level: Simple
Continue Regimen: OTC Rogaine daily\\nKetoconazole shampoo 4x a week
Hide Aquaphor Products: No
Action 2: Continue
Action 1: Start
Continue Regimen: Spironolactone 25mg twice a day
Treatment 1: tretinoin 0.025% cream
Sig For Treatment 1 (If Needed): applied every other day

## 2023-08-22 NOTE — PROCEDURE: ADDITIONAL NOTES
Render Risk Assessment In Note?: no
Detail Level: Detailed
Additional Notes: Having irregular menstrual cycle, will follow up with ob gyn to see if due to Spironolactone\\nHas an appointment in 3 weeks
Additional Notes: Cannot tolerate drysol due to irritation\\n Discussed Botox however given hands would be uncomfortable and May decrease fine motor skills\\nWill explore iontophoresis to be done twice weekly

## 2023-09-15 ENCOUNTER — HOSPITAL ENCOUNTER (OUTPATIENT)
Dept: LAB | Facility: MEDICAL CENTER | Age: 23
End: 2023-09-15
Attending: PHYSICIAN ASSISTANT
Payer: COMMERCIAL

## 2023-09-15 PROCEDURE — 87491 CHLMYD TRACH DNA AMP PROBE: CPT

## 2023-09-15 PROCEDURE — 87591 N.GONORRHOEAE DNA AMP PROB: CPT

## 2023-09-15 PROCEDURE — 88175 CYTOPATH C/V AUTO FLUID REDO: CPT

## 2023-09-21 LAB
C TRACH RRNA CVX QL NAA+PROBE: NEGATIVE
COMMENT NL11729A: NORMAL
CYTOLOGIST CVX/VAG CYTO: NORMAL
CYTOLOGY CVX/VAG DOC CYTO: NORMAL
CYTOLOGY CVX/VAG DOC THIN PREP: NORMAL
N GONORRHOEA RRNA CVX QL NAA+PROBE: NEGATIVE
NOTE NL11727A: NORMAL
OTHER STN SPEC: NORMAL
QC REVIEWED BY NL11722A: NORMAL
STAT OF ADQ CVX/VAG CYTO-IMP: NORMAL

## 2023-11-17 ENCOUNTER — HOSPITAL ENCOUNTER (OUTPATIENT)
Dept: PEDIATRIC HEMATOLOGY/ONCOLOGY | Facility: MEDICAL CENTER | Age: 23
End: 2023-11-17
Attending: PEDIATRICS
Payer: COMMERCIAL

## 2023-11-17 ENCOUNTER — APPOINTMENT (RX ONLY)
Dept: URBAN - METROPOLITAN AREA CLINIC 22 | Facility: CLINIC | Age: 23
Setting detail: DERMATOLOGY
End: 2023-11-17

## 2023-11-17 VITALS
TEMPERATURE: 98.9 F | SYSTOLIC BLOOD PRESSURE: 132 MMHG | OXYGEN SATURATION: 97 % | DIASTOLIC BLOOD PRESSURE: 79 MMHG | BODY MASS INDEX: 26.64 KG/M2 | HEIGHT: 66 IN | WEIGHT: 165.79 LBS | HEART RATE: 100 BPM

## 2023-11-17 DIAGNOSIS — L65.9 NONSCARRING HAIR LOSS, UNSPECIFIED: ICD-10-CM | Status: STABLE

## 2023-11-17 DIAGNOSIS — Z85.6 HISTORY OF LEUKEMIA: ICD-10-CM

## 2023-11-17 DIAGNOSIS — L70.0 ACNE VULGARIS: ICD-10-CM

## 2023-11-17 DIAGNOSIS — L74.51 PRIMARY FOCAL HYPERHIDROSIS: ICD-10-CM | Status: INADEQUATELY CONTROLLED

## 2023-11-17 DIAGNOSIS — Z13.6 SCREENING FOR HEART DISEASE: ICD-10-CM

## 2023-11-17 PROBLEM — L74.512 PRIMARY FOCAL HYPERHIDROSIS, PALMS: Status: ACTIVE | Noted: 2023-11-17

## 2023-11-17 PROCEDURE — ? IONTOPHORESIS

## 2023-11-17 PROCEDURE — 97033 APP MDLTY 1+IONTPHRSIS EA 15: CPT

## 2023-11-17 PROCEDURE — 99212 OFFICE O/P EST SF 10 MIN: CPT | Performed by: PEDIATRICS

## 2023-11-17 PROCEDURE — ? TREATMENT REGIMEN

## 2023-11-17 PROCEDURE — ? PRESCRIPTION

## 2023-11-17 PROCEDURE — 99213 OFFICE O/P EST LOW 20 MIN: CPT | Performed by: PEDIATRICS

## 2023-11-17 PROCEDURE — 99214 OFFICE O/P EST MOD 30 MIN: CPT | Mod: 25

## 2023-11-17 PROCEDURE — ? COUNSELING

## 2023-11-17 RX ORDER — TRAZODONE HYDROCHLORIDE 50 MG/1
25 TABLET ORAL NIGHTLY
COMMUNITY

## 2023-11-17 RX ORDER — GLYCOPYRROLATE 1 MG/1
TABLET ORAL BID
Qty: 90 | Refills: 5 | Status: ERX

## 2023-11-17 RX ORDER — CLINDAMYCIN PHOSPHATE 10 MG/ML
LOTION TOPICAL
Qty: 60 | Refills: 3 | Status: ERX | COMMUNITY
Start: 2023-11-17

## 2023-11-17 RX ADMIN — CLINDAMYCIN PHOSPHATE: 10 LOTION TOPICAL at 00:00

## 2023-11-17 ASSESSMENT — LOCATION SIMPLE DESCRIPTION DERM
LOCATION SIMPLE: LEFT HAND
LOCATION SIMPLE: RIGHT HAND
LOCATION SIMPLE: RIGHT CHEEK
LOCATION SIMPLE: SUPERIOR FOREHEAD
LOCATION SIMPLE: LEFT CHEEK
LOCATION SIMPLE: SCALP

## 2023-11-17 ASSESSMENT — LOCATION ZONE DERM
LOCATION ZONE: HAND
LOCATION ZONE: SCALP
LOCATION ZONE: FACE

## 2023-11-17 ASSESSMENT — LOCATION DETAILED DESCRIPTION DERM
LOCATION DETAILED: LEFT ULNAR PALM
LOCATION DETAILED: LEFT THENAR EMINENCE
LOCATION DETAILED: LEFT SUPERIOR PARIETAL SCALP
LOCATION DETAILED: RIGHT RADIAL PALM
LOCATION DETAILED: RIGHT ULNAR PALM
LOCATION DETAILED: SUPERIOR MID FOREHEAD
LOCATION DETAILED: RIGHT INFERIOR CENTRAL MALAR CHEEK
LOCATION DETAILED: LEFT INFERIOR CENTRAL MALAR CHEEK

## 2023-11-17 ASSESSMENT — FIBROSIS 4 INDEX: FIB4 SCORE: 0.51

## 2023-11-17 NOTE — PROCEDURE: TREATMENT REGIMEN
Continue Regimen: glycopyrrolate 3mg daily
Initiate Treatment: iontophoresis
Detail Level: Simple
Hide Aquaphor Products: No
Action 2: Start
Action 1: Continue
Treatment 2: clindamycin 1%
Continue Regimen: Spironolactone 25mg once a day
Treatment 1: tretinoin 0.025% cream
Sig For Treatment 2 (If Needed): twice daily
Sig For Treatment 1 (If Needed): applied every other day
Continue Regimen: OTC Rogaine daily\\nKetoconazole shampoo 4x a week
Initiate Treatment: Hair max light device or Capillus  device

## 2023-11-17 NOTE — PROCEDURE: IONTOPHORESIS
Detail Level: Zone
Treatment #: 0
Bill For [id number]?: No
Treatment Interval: bi-weekly
50228 Units Billed: 1
Iontophoresis Treatment Protocol Text: The patient would like iontophoresis for hands
Consent obtained. The risks and benefits of iontophoresis was discussed in detail. Specifically, the risks of local irritation and inadequate treatment were discussed. The treatment sites were clearly identified and confirmed by the patient. Informed patient device may not be covered by her health plan coverage.
Post-Care Instructions: The patient was instructed to call the office if skin irritation persists.

## 2023-11-18 NOTE — PROGRESS NOTES
Pediatric Hematology/Oncology Clinic  Progress Note      Patient Name:  Emy Francis  : 2000   MRN: [id number]    Location of Service: [facility]    Date of Service: 2023  Time: 1:00 PM    Primary Care Physician: Cadence Morales M.D.    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  female who returns to the [facility] for follow-up of her history of Acute Promyelocytic Leukemia.  Emy presents by herself and provides accurate clinical history.    Emy was otherwise healthy until mid-2015 when she began to develop fatigue and a headache.  She also noticed at the time a bruise on the left side of her breast.  Over the next couple of weeks, the headaches worsened and she began to develop more symptoms including a constant bloody nose and light headedness.  She also began to develop petechiae on her legs, arms and shoulders.  The bruising worsened and she began to develop shortness of breath.  She was brought to her pediatrician's office with gum bleeding, lightheadedness and bloody urin on 11/30/15.  A routine set of labs were obtained, but before they had resulted, Emy was brought to the hospital with increasing shortness of breath.  Dr. Joseph saw her upon admission and informed her that her counts were all low and she would be transferred to [facility] for work-up and treatment.  She was transferred on 12/1/15 and a leukemia work-up was started.  A bone marrow evaluation at [facility] was remarkable for acute promyelocytic leukemia.  Her initial lumbar puncture was negative for CSF disease.  Flow cytometry was strongly positive for CD13 and CD33; mild positivity for HLA-DR and CD34.  FISH for t(15,17) was confirmed.   Pre-treatment with all-trans retinoic acid (ATRA) was initiated prior to confirmatory results on 12/3/15.  Initial supportive care also included PRBC transfusion.  Work-up was complicated by what Emy describes as a spinal headache which lasted the entire first moth of treatment.  Following confirmatory results, Emy was consented for and enrolled on [facility] [id number]. Induction with arsenic trioxide and ATRA was started started 12/5/15 and completed 1/4/16.  Induction was not only complicated by a persistent headache, but Emy also experienced rising white blood cell count requiring the initiation of hydroxyurea and decadron on 12/15/15 and ending on 12/24/15 when WBC finally dropped below 10,000.  Persistent headaches forced ATRA to be held 12/25/15 and restarted on 12/26/15 at 75% dosing.  Although there was breif improvement, the following week, 1/4/16 Emy developed double vision and esotropia in her right eye.  Ophthalmologic examination was remarkable for papilledema and ATRA again was held.  Ultimately she would be treated with Diamox for pseudotumor cerebri resulting from ATRA therapy.  I  Induction was also complicated by coagulopathy mild coagulopathy, spinal headaches, nausea/vomitting, plantar palmar erythrodysesthesia, capillary leak syndrome, skin hypersensitivity and steroid intolerance. Post-Induction evaluation was unremarkable for residual disease.  Consolidation was scheduled for start of 1/18/16, but was delayed two weeks due to myelosuppression and started 2/1/16.  The remainder of Consolidation Cycle 1 was unremarkable and primarily therapy was given in [place].  Consolidation Cycle 2 was started as scheduled 3/28/16 and Emy's end of Cycle 2 bone marrow evaluation was unremarkable for any MRD (PML-RAR?).  Consolidation Cycles 3 and 4 were unremarkable and therapy was completed 8/17/16.  Emy was seen in follow-up for the first 5 months off therapy by Dr. Joseph in [place].  During  "this period of time she was weaned from her Diamox and there have been no further complications of her therapy.    Her off therapy care was then transferred to [facility].       Emy was last seen in clinic 11/28/2022 at which time she was healthy without any significant complaints.  Today, she presented similarly good health.  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  She reports that she has not had any issues with cognition, memory or executive function.  No complaints of any significant headaches, changes in vision or neurologic status changes.  She reports that her mental health is good and she continues to take Lexapro, Ativan as needed and trazodone for sleep.  She continues to use glycopyrrolate for her hyperhidrosis as well as Rogaine for thinning hair.  She does report that she has seen a dermatologist and was prescribed spironolactone for hair and acne.  She reports that the dose needed to be reduced given that it caused her to have irregular periods.  Aside from the spironolactone causing irregular periods her periods have otherwise been normal.  She continues to have an IUD.  She does report today that in either 2020 or 2021 she had a \"teratoma\" removed from her ovary.  No complaints of any aches or pains or musculoskeletal concerns.  No complaints of any nausea hemoglobin A or diarrhea constipation.  Eating well.  No other concerns or complaints today.     Review of Systems:     Constitutional: Afebrile.  Without recent illness.  Energy and activity are good.   HENT: Negative.  Eyes: Negative for visual changes.  Respiratory: Negative for shortness of breath.  Cardiovascular: Negative.  Gastrointestinal: Negative for nausea, vomiting, abdominal pain, diarrhea, constipation.  Genitourinary: Negative.  Normal menses.  IUD in place.  Musculoskeletal: Negative.  Skin: Negative for " "rash, signs of infection.  Neurological: Negative for numbness, tingling, sensory changes, weakness or headaches.    Endo/Heme/Allergies: Does not bruise/bleed easily.    Psychiatric/Behavioral: No changes in mood, appropriate for age.     PAST MEDICAL HISTORY:     Past Medical History:     1) Previously health, only one episode of otitis media as a child  2) Acute Promyelocytic Leukemia (APML) diagnosis  3) No hospitalizations or surgeries prior to diagnosis of APML  4) Seasonal allergies  5) Obesity (RESOLVED)  6) Ovarian Cyst  7) Disordered Eating  8) Panic attacks  9) Depression/Anxiety  10) Hyperhidrosis  11) Thinning hair       Past Surgical History:      1) Therapy related bone marrow aspiration, lumbar punctures  2) Port-a-cath placement and removal     Birth/Developmental History:     1st of 2 children  No complications of pregnancy, good prenatal care  Delivered at 39 weeks EGA, no complications of delivery, however transferred to the NICU for temperature of 103F - stayed for 10 days  No further complications  Never any concerns for growth and development  Has always met milestones      Menstrual History:  IUD currently in place.  Normal menses    Allergies:         Allergies as of 08/15/2018    (No Known Allergies)      Social History:   Lives with fiancé.  Attending school.     Family History:      1) Maternal grandfather with Stage IV gastric Ca.  2) Paternal grandfather with \"pre-kidney cancer\"  3) Cousin with lupus  4) Family history of HTN  5) No childhood cancers, no childhood unexpected deaths or illness, no rheumatologicc disease, bleeding or clotting disorders.     Immunizations:  Up to jackie    Medications:   Current Outpatient Medications on File Prior to Encounter   Medication Sig Dispense Refill    traZODone (DESYREL) 50 MG Tab Take 25 mg by mouth every evening.      Glycopyrrolate 1.5 MG Tab Take 1 Tablet by mouth 2 times a day.      escitalopram (LEXAPRO) 10 MG Tab Take 20 mg by mouth every " "day.      ketoconazole (NIZORAL) 2 % shampoo       LORazepam (ATIVAN) 0.5 MG Tab       spironolactone (ALDACTONE) 25 MG Tab        No current facility-administered medications on file prior to encounter.         OBJECTIVE:     Vitals:   /79 (BP Location: Left arm, Patient Position: Sitting, BP Cuff Size: Adult)   Pulse 100   Temp 37.2 °C (98.9 °F) (Temporal)   Ht 1.67 m (5' 5.75\")   Wt 75.2 kg (165 lb 12.6 oz)   SpO2 97%     Labs:    CBC, CMP, vitamin D, TSH, free T4 ordered and to be obtained.    Physical Exam:    Constitutional: Well-developed, well-nourished, and in no distress.  Very well-appearing.  HENT: Normocephalic and atraumatic. No nasal congestion or rhinorrhea. Oropharynx is clear and moist. No oral ulcerations or sores.    Eyes: Conjunctivae are normal. Pupils are equal, round.  EOMI.  Nonicteric.  Neck: Normal range of motion of neck, no adenopathy.    Cardiovascular: Normal rate, regular rhythm and normal heart sounds.  No murmur heard. DP/radial pulses 2+, cap refill < 2 sec.  Pulmonary/Chest: Effort normal and breath sounds normal. No respiratory distress. Symmetric expansion.  No crackles or wheezes.  Abdomen: Soft. Bowel sounds are normal. No distension and no mass. There is no hepatosplenomegaly.    Genitourinary:  Deferred  Musculoskeletal: Normal range of motion of lower and upper extremities bilaterally.   Neurological: Alert and oriented to person and place. Exhibits normal muscle tone bilaterally in upper and lower extremities. Gait normal. Coordination normal.    Skin: Skin is warm, dry and pink.  No rash or evidence of skin infection.  No pallor.   Psychiatric: Mood and affect normal for age.    ASSESSMENT AND PLAN:     Emy Francis is a 23 year old previously healthy female with a history of APML, treated on [id number] for 87 months (7 years ) off therapy follow-up.     1) Acute Promyelocytic Leukemia, History:              - Completed therapy ON STUDY [id number], now 75 " months off therapy              - CBC with differential ordered              - No physical evidence of disease              - Will RTC 12 months - continue data submission for on study [id number](OS)     2) Panic Attacks:              - Ativan              - With recognition and treatment (medical marijuana) improved greatly     3) History of Hypertension:              - Blood pressure elevated 132/79 mm Hg.                - Per personal report, is seen across a number of disciplines and all other disciplines have normal blood pressures recorded.     4) History of Vitamin D Deficiency:              -Vitamin D level pending     7) Survivorship/Late Effects Monitoring:              - Cognitive: No recent neurocognitive testing.  Improve memory, no cognitive concerns at this visit.              - Psychosocial:  No reports of behavioral issues.              - Renal:  Blood pressure only slightly elevated but continues to describe normal blood pressures at other providers office.  Creatinine and electrolytes ordered and to be obtained.              - Cardiac:  ECHO yearly to 10 years.  Recent echocardiogram earlier this year with significant drop in EF to 55%.  Given drop, will obtain another echocardiogram in early 2024.              - Pulmonary:  No chest/mediastinal radiation.  Clinical exam unremarkable.              - Musckuloskeletal:  High dose steroid exposure during therapy.  History of low vitamin D levels, continue on Vitamin D - Calcium supplementation with daily chews.  Vitamin D level ordered today.              - Growth and Development: No longer obtaining weights with every visit.              - Reproductive:  IUD in place.  Will no longer obtain anti-müllerian hormone as patient does not wish to have children.              - Performance:  Karnofsky 100 / ECOG 1     Follow up for COG [id number]:       End of Therapy Follow-up Relapse   Physical Exam with VS X Monthly to 12 months  Q3 months to 36 months  Q6  months to 48 months  Yearly to 10 years X   Ht, Wt, BSA                          X   Performance Status X   X   CBC, Diff, Plts X Monthly to 12 months  Q3 months to 36 months  Q6 months to 48 months  Yearly to 10 years X   Electrolytes (Ca++, Mg++, K+) and Creatinine     X   Uric Acid     X   ECHO   X Yearly to 10 years X   BMA X   X         Disposition:  Return to clinic in 12 months for 99 month off therapy evaluation, PE, and labs     Heriberto Hylton MD  Pediatric Hematology / Oncology  [facility]  Cell.  [phone]  Office. [phone]

## 2023-12-01 ENCOUNTER — HOSPITAL ENCOUNTER (OUTPATIENT)
Dept: LAB | Facility: MEDICAL CENTER | Age: 23
End: 2023-12-01
Attending: PEDIATRICS
Payer: COMMERCIAL

## 2023-12-01 DIAGNOSIS — Z85.6 HISTORY OF LEUKEMIA: ICD-10-CM

## 2023-12-01 LAB
ALBUMIN SERPL BCP-MCNC: 4.7 G/DL (ref 3.2–4.9)
ALBUMIN/GLOB SERPL: 2 G/DL
ALP SERPL-CCNC: 51 U/L (ref 30–99)
ALT SERPL-CCNC: 11 U/L (ref 2–50)
ANION GAP SERPL CALC-SCNC: 10 MMOL/L (ref 7–16)
AST SERPL-CCNC: 17 U/L (ref 12–45)
BASOPHILS # BLD AUTO: 0.2 % (ref 0–1.8)
BASOPHILS # BLD: 0.01 K/UL (ref 0–0.12)
BILIRUB SERPL-MCNC: 0.6 MG/DL (ref 0.1–1.5)
BUN SERPL-MCNC: 16 MG/DL (ref 8–22)
CALCIUM ALBUM COR SERPL-MCNC: 8.8 MG/DL (ref 8.5–10.5)
CALCIUM SERPL-MCNC: 9.4 MG/DL (ref 8.5–10.5)
CHLORIDE SERPL-SCNC: 101 MMOL/L (ref 96–112)
CO2 SERPL-SCNC: 26 MMOL/L (ref 20–33)
CREAT SERPL-MCNC: 0.76 MG/DL (ref 0.5–1.4)
EOSINOPHIL # BLD AUTO: 0.13 K/UL (ref 0–0.51)
EOSINOPHIL NFR BLD: 2.2 % (ref 0–6.9)
ERYTHROCYTE [DISTWIDTH] IN BLOOD BY AUTOMATED COUNT: 40.4 FL (ref 35.9–50)
GFR SERPLBLD CREATININE-BSD FMLA CKD-EPI: 112 ML/MIN/1.73 M 2
GLOBULIN SER CALC-MCNC: 2.4 G/DL (ref 1.9–3.5)
GLUCOSE SERPL-MCNC: 84 MG/DL (ref 65–99)
HCT VFR BLD AUTO: 42.9 % (ref 37–47)
HGB BLD-MCNC: 14.8 G/DL (ref 12–16)
IMM GRANULOCYTES # BLD AUTO: 0.01 K/UL (ref 0–0.11)
IMM GRANULOCYTES NFR BLD AUTO: 0.2 % (ref 0–0.9)
LYMPHOCYTES # BLD AUTO: 1.82 K/UL (ref 1–4.8)
LYMPHOCYTES NFR BLD: 30.8 % (ref 22–41)
MCH RBC QN AUTO: 33.4 PG (ref 27–33)
MCHC RBC AUTO-ENTMCNC: 34.5 G/DL (ref 32.2–35.5)
MCV RBC AUTO: 96.8 FL (ref 81.4–97.8)
MONOCYTES # BLD AUTO: 0.35 K/UL (ref 0–0.85)
MONOCYTES NFR BLD AUTO: 5.9 % (ref 0–13.4)
NEUTROPHILS # BLD AUTO: 3.59 K/UL (ref 1.82–7.42)
NEUTROPHILS NFR BLD: 60.7 % (ref 44–72)
NRBC # BLD AUTO: 0 K/UL
NRBC BLD-RTO: 0 /100 WBC (ref 0–0.2)
PLATELET # BLD AUTO: 249 K/UL (ref 164–446)
PMV BLD AUTO: 9.4 FL (ref 9–12.9)
POTASSIUM SERPL-SCNC: 4.1 MMOL/L (ref 3.6–5.5)
PROT SERPL-MCNC: 7.1 G/DL (ref 6–8.2)
RBC # BLD AUTO: 4.43 M/UL (ref 4.2–5.4)
SODIUM SERPL-SCNC: 137 MMOL/L (ref 135–145)
WBC # BLD AUTO: 5.9 K/UL (ref 4.8–10.8)

## 2023-12-01 PROCEDURE — 84439 ASSAY OF FREE THYROXINE: CPT

## 2023-12-01 PROCEDURE — 82306 VITAMIN D 25 HYDROXY: CPT

## 2023-12-01 PROCEDURE — 84443 ASSAY THYROID STIM HORMONE: CPT

## 2023-12-01 PROCEDURE — 36415 COLL VENOUS BLD VENIPUNCTURE: CPT

## 2023-12-01 PROCEDURE — 83520 IMMUNOASSAY QUANT NOS NONAB: CPT

## 2023-12-01 PROCEDURE — 85025 COMPLETE CBC W/AUTO DIFF WBC: CPT

## 2023-12-01 PROCEDURE — 80053 COMPREHEN METABOLIC PANEL: CPT

## 2023-12-02 LAB
25(OH)D3 SERPL-MCNC: 25 NG/ML (ref 30–100)
T4 FREE SERPL-MCNC: 1.17 NG/DL (ref 0.93–1.7)
TSH SERPL DL<=0.005 MIU/L-ACNC: 2.06 UIU/ML (ref 0.38–5.33)

## 2023-12-04 LAB — MIS SERPL-MCNC: 1.45 NG/ML (ref 0.4–16.02)

## 2024-04-05 ENCOUNTER — ANCILLARY PROCEDURE (OUTPATIENT)
Dept: CARDIOLOGY | Facility: MEDICAL CENTER | Age: 24
End: 2024-04-05
Attending: PEDIATRICS
Payer: COMMERCIAL

## 2024-04-05 DIAGNOSIS — Z13.6 SCREENING FOR HEART DISEASE: ICD-10-CM

## 2024-04-05 LAB
LV EJECT FRACT  99904: 55
LV EJECT FRACT MOD 2C 99903: 55.56
LV EJECT FRACT MOD 4C 99902: 47.5
LV EJECT FRACT MOD BP 99901: 52.51

## 2024-04-05 PROCEDURE — 93306 TTE W/DOPPLER COMPLETE: CPT | Mod: 26 | Performed by: INTERNAL MEDICINE

## 2024-04-05 PROCEDURE — 93306 TTE W/DOPPLER COMPLETE: CPT

## 2024-07-25 ENCOUNTER — APPOINTMENT (RX ONLY)
Dept: URBAN - METROPOLITAN AREA CLINIC 22 | Facility: CLINIC | Age: 24
Setting detail: DERMATOLOGY
End: 2024-07-25

## 2024-07-25 DIAGNOSIS — L74.51 PRIMARY FOCAL HYPERHIDROSIS: ICD-10-CM | Status: STABLE

## 2024-07-25 DIAGNOSIS — L65.9 NONSCARRING HAIR LOSS, UNSPECIFIED: ICD-10-CM | Status: STABLE

## 2024-07-25 DIAGNOSIS — L70.0 ACNE VULGARIS: ICD-10-CM | Status: INADEQUATELY CONTROLLED

## 2024-07-25 PROBLEM — L74.512 PRIMARY FOCAL HYPERHIDROSIS, PALMS: Status: ACTIVE | Noted: 2024-07-25

## 2024-07-25 PROCEDURE — ? COUNSELING

## 2024-07-25 PROCEDURE — ? PRESCRIPTION

## 2024-07-25 PROCEDURE — 99214 OFFICE O/P EST MOD 30 MIN: CPT

## 2024-07-25 PROCEDURE — ? TREATMENT REGIMEN

## 2024-07-25 RX ORDER — CLINDAMYCIN PHOSPHATE/BENZOYL PEROXIDE/ADAPALENE 1.5; 31; 12 MG/G; MG/G; MG/G
1 GEL TOPICAL QD
Qty: 50 | Refills: 5 | Status: ERX | COMMUNITY
Start: 2024-07-25

## 2024-07-25 RX ORDER — GLYCOPYRROLATE 1 MG/1
TABLET ORAL BID
Qty: 90 | Refills: 5 | Status: ERX

## 2024-07-25 RX ADMIN — CLINDAMYCIN PHOSPHATE/BENZOYL PEROXIDE/ADAPALENE 1: 1.5; 31; 12 GEL TOPICAL at 00:00

## 2024-07-25 ASSESSMENT — LOCATION SIMPLE DESCRIPTION DERM
LOCATION SIMPLE: LEFT CHEEK
LOCATION SIMPLE: SCALP
LOCATION SIMPLE: RIGHT CHEEK
LOCATION SIMPLE: RIGHT HAND
LOCATION SIMPLE: LEFT HAND
LOCATION SIMPLE: SUPERIOR FOREHEAD

## 2024-07-25 ASSESSMENT — LOCATION DETAILED DESCRIPTION DERM
LOCATION DETAILED: SUPERIOR MID FOREHEAD
LOCATION DETAILED: RIGHT ULNAR PALM
LOCATION DETAILED: LEFT SUPERIOR PARIETAL SCALP
LOCATION DETAILED: LEFT ULNAR PALM
LOCATION DETAILED: RIGHT INFERIOR CENTRAL MALAR CHEEK
LOCATION DETAILED: LEFT INFERIOR CENTRAL MALAR CHEEK

## 2024-07-25 ASSESSMENT — LOCATION ZONE DERM
LOCATION ZONE: HAND
LOCATION ZONE: SCALP
LOCATION ZONE: FACE

## 2024-07-25 NOTE — PROCEDURE: TREATMENT REGIMEN
Continue Regimen: glycopyrrolate 2 mg in morning and 1 mg in evening
Detail Level: Simple
Continue Regimen: OTC Rogaine daily\\nKetoconazole shampoo 4x a week
Initiate Treatment: Hair max light device or Capillus  device

## 2024-11-20 ENCOUNTER — TELEPHONE (OUTPATIENT)
Dept: PEDIATRIC HEMATOLOGY/ONCOLOGY | Facility: MEDICAL CENTER | Age: 24
End: 2024-11-20
Payer: COMMERCIAL

## 2024-11-20 NOTE — TELEPHONE ENCOUNTER
Voicemail left in an attempt to schedule yearly follow up appointment. Return contact information left.

## 2024-12-12 ENCOUNTER — HOSPITAL ENCOUNTER (OUTPATIENT)
Dept: PEDIATRIC HEMATOLOGY/ONCOLOGY | Facility: MEDICAL CENTER | Age: 24
End: 2024-12-12
Attending: PEDIATRICS
Payer: COMMERCIAL

## 2024-12-12 ENCOUNTER — HOSPITAL ENCOUNTER (OUTPATIENT)
Facility: MEDICAL CENTER | Age: 24
End: 2024-12-12
Attending: PEDIATRICS
Payer: COMMERCIAL

## 2024-12-12 VITALS
HEIGHT: 67 IN | SYSTOLIC BLOOD PRESSURE: 128 MMHG | DIASTOLIC BLOOD PRESSURE: 72 MMHG | WEIGHT: 187.17 LBS | BODY MASS INDEX: 29.38 KG/M2 | TEMPERATURE: 99 F | HEART RATE: 85 BPM | OXYGEN SATURATION: 94 %

## 2024-12-12 DIAGNOSIS — Z08 ROUTINE CANCER FOLLOW-UP VISIT: ICD-10-CM

## 2024-12-12 DIAGNOSIS — Z85.6 HISTORY OF LEUKEMIA: ICD-10-CM

## 2024-12-12 LAB
25(OH)D3 SERPL-MCNC: 24 NG/ML (ref 30–100)
ALBUMIN SERPL BCP-MCNC: 4.5 G/DL (ref 3.2–4.9)
ALBUMIN/GLOB SERPL: 1.6 G/DL
ALP SERPL-CCNC: 68 U/L (ref 30–99)
ALT SERPL-CCNC: 10 U/L (ref 2–50)
ANION GAP SERPL CALC-SCNC: 11 MMOL/L (ref 7–16)
AST SERPL-CCNC: 19 U/L (ref 12–45)
BASOPHILS # BLD AUTO: 0.5 % (ref 0–1.8)
BASOPHILS # BLD: 0.05 K/UL (ref 0–0.12)
BILIRUB SERPL-MCNC: 0.4 MG/DL (ref 0.1–1.5)
BUN SERPL-MCNC: 9 MG/DL (ref 8–22)
CALCIUM ALBUM COR SERPL-MCNC: 9.1 MG/DL (ref 8.5–10.5)
CALCIUM SERPL-MCNC: 9.5 MG/DL (ref 8.5–10.5)
CHLORIDE SERPL-SCNC: 106 MMOL/L (ref 96–112)
CO2 SERPL-SCNC: 19 MMOL/L (ref 20–33)
CREAT SERPL-MCNC: 0.81 MG/DL (ref 0.5–1.4)
EOSINOPHIL # BLD AUTO: 0.27 K/UL (ref 0–0.51)
EOSINOPHIL NFR BLD: 2.9 % (ref 0–6.9)
ERYTHROCYTE [DISTWIDTH] IN BLOOD BY AUTOMATED COUNT: 38.6 FL (ref 35.9–50)
GFR SERPLBLD CREATININE-BSD FMLA CKD-EPI: 103 ML/MIN/1.73 M 2
GLOBULIN SER CALC-MCNC: 2.9 G/DL (ref 1.9–3.5)
GLUCOSE SERPL-MCNC: 89 MG/DL (ref 65–99)
HCT VFR BLD AUTO: 44 % (ref 37–47)
HGB BLD-MCNC: 15.4 G/DL (ref 12–16)
IMM GRANULOCYTES # BLD AUTO: 0.04 K/UL (ref 0–0.11)
IMM GRANULOCYTES NFR BLD AUTO: 0.4 % (ref 0–0.9)
LYMPHOCYTES # BLD AUTO: 2.78 K/UL (ref 1–4.8)
LYMPHOCYTES NFR BLD: 29.8 % (ref 22–41)
MAGNESIUM SERPL-MCNC: 2 MG/DL (ref 1.5–2.5)
MCH RBC QN AUTO: 33 PG (ref 27–33)
MCHC RBC AUTO-ENTMCNC: 35 G/DL (ref 32.2–35.5)
MCV RBC AUTO: 94.4 FL (ref 81.4–97.8)
MONOCYTES # BLD AUTO: 0.61 K/UL (ref 0–0.85)
MONOCYTES NFR BLD AUTO: 6.5 % (ref 0–13.4)
NEUTROPHILS # BLD AUTO: 5.57 K/UL (ref 1.82–7.42)
NEUTROPHILS NFR BLD: 59.9 % (ref 44–72)
NRBC # BLD AUTO: 0 K/UL
NRBC BLD-RTO: 0 /100 WBC (ref 0–0.2)
PHOSPHATE SERPL-MCNC: 3.6 MG/DL (ref 2.5–4.5)
PLATELET # BLD AUTO: 274 K/UL (ref 164–446)
PMV BLD AUTO: 9.4 FL (ref 9–12.9)
POTASSIUM SERPL-SCNC: 4.6 MMOL/L (ref 3.6–5.5)
PROT SERPL-MCNC: 7.4 G/DL (ref 6–8.2)
RBC # BLD AUTO: 4.66 M/UL (ref 4.2–5.4)
SODIUM SERPL-SCNC: 136 MMOL/L (ref 135–145)
T4 FREE SERPL-MCNC: 1.18 NG/DL (ref 0.93–1.7)
TSH SERPL-ACNC: 1.99 UIU/ML (ref 0.35–5.5)
WBC # BLD AUTO: 9.3 K/UL (ref 4.8–10.8)

## 2024-12-12 PROCEDURE — 82166 ASSAY ANTI-MULLERIAN HORM: CPT

## 2024-12-12 PROCEDURE — 80053 COMPREHEN METABOLIC PANEL: CPT

## 2024-12-12 PROCEDURE — 82306 VITAMIN D 25 HYDROXY: CPT

## 2024-12-12 PROCEDURE — 83735 ASSAY OF MAGNESIUM: CPT

## 2024-12-12 PROCEDURE — 99213 OFFICE O/P EST LOW 20 MIN: CPT | Performed by: PEDIATRICS

## 2024-12-12 PROCEDURE — 84100 ASSAY OF PHOSPHORUS: CPT

## 2024-12-12 PROCEDURE — 84439 ASSAY OF FREE THYROXINE: CPT

## 2024-12-12 PROCEDURE — 85025 COMPLETE CBC W/AUTO DIFF WBC: CPT

## 2024-12-12 PROCEDURE — 36415 COLL VENOUS BLD VENIPUNCTURE: CPT

## 2024-12-12 PROCEDURE — 84443 ASSAY THYROID STIM HORMONE: CPT

## 2024-12-12 RX ORDER — ALPRAZOLAM 0.5 MG
0.5 TABLET ORAL NIGHTLY PRN
COMMUNITY

## 2024-12-12 ASSESSMENT — FIBROSIS 4 INDEX: FIB4 SCORE: 0.49

## 2024-12-12 NOTE — PROGRESS NOTES
Pt to  [facility] for lab draw and  visit.  Afebrile.  VSS.  Awake and alert in no acute distress.  Labs drawn from the left ac without difficulty / with 1 attempt.   Pt became light headed during lab draw. Pt placed in supine position. Pt states improvement after several minutes lying down. Pt provided with orange juice. Pt states all symptoms resolved. Pt ambulated out of office without difficulty. Plan to follow up in 1 year.

## 2024-12-13 NOTE — PROGRESS NOTES
Pediatric Hematology/Oncology Clinic  Progress Note      Patient Name:  Emy Francis  : 2000   MRN: [id number]    Location of Service: [facility]    Date of Service: 2024  Time: 1:00 PM    Primary Care Physician: Cadence Morales M.D.    HISTORY OF PRESENT ILLNESS:     Chief Complaint: Follow-up of Acute Promyelocytic Leukemia ON STUDY [id number](OS) - now 8 years off therapy     History of Present Illness: Emy Francis is a 24 y.o.  female who returns to the [facility] for follow-up of of her history of Acute Promyelocytic Leukemia.  Emy presents by herself and provides accurate clinical history.     Emy was otherwise healthy until mid-2015 when she began to develop fatigue and a headache.  She also noticed at the time a bruise on the left side of her breast.  Over the next couple of weeks, the headaches worsened and she began to develop more symptoms including a constant bloody nose and light headedness.  She also began to develop petechiae on her legs, arms and shoulders.  The bruising worsened and she began to develop shortness of breath.  She was brought to her pediatrician's office with gum bleeding, lightheadedness and bloody urin on 11/30/15.  A routine set of labs were obtained, but before they had resulted, Emy was brought to the hospital with increasing shortness of breath.  Dr. Joseph saw her upon admission and informed her that her counts were all low and she would be transferred to [facility] for work-up and treatment.  She was transferred on 12/1/15 and a leukemia work-up was started.  A bone marrow evaluation at [facility] was remarkable for acute promyelocytic leukemia.  Her initial lumbar puncture was negative for CSF disease.  Flow cytometry was strongly positive for CD13 and CD33; mild positivity for HLA-DR and CD34.  FISH for t(15,17) was confirmed.   Pre-treatment with all-trans retinoic acid (ATRA) was initiated prior to confirmatory results on 12/3/15.  Initial supportive care also included PRBC transfusion.  Work-up was complicated by what Emy describes as a spinal headache which lasted the entire first moth of treatment.  Following confirmatory results, Emy was consented for and enrolled on [facility] [id number]. Induction with arsenic trioxide and ATRA was started started 12/5/15 and completed 1/4/16.  Induction was not only complicated by a persistent headache, but Emy also experienced rising white blood cell count requiring the initiation of hydroxyurea and decadron on 12/15/15 and ending on 12/24/15 when WBC finally dropped below 10,000.  Persistent headaches forced ATRA to be held 12/25/15 and restarted on 12/26/15 at 75% dosing.  Although there was breif improvement, the following week, 1/4/16 Emy developed double vision and esotropia in her right eye.  Ophthalmologic examination was remarkable for papilledema and ATRA again was held.  Ultimately she would be treated with Diamox for pseudotumor cerebri resulting from ATRA therapy.  I  Induction was also complicated by coagulopathy mild coagulopathy, spinal headaches, nausea/vomitting, plantar palmar erythrodysesthesia, capillary leak syndrome, skin hypersensitivity and steroid intolerance. Post-Induction evaluation was unremarkable for residual disease.  Consolidation was scheduled for start of 1/18/16, but was delayed two weeks due to myelosuppression and started 2/1/16.  The remainder of Consolidation Cycle 1 was unremarkable and primarily therapy was given in [place].  Consolidation Cycle 2 was started as scheduled 3/28/16 and Emy's end of Cycle 2 bone marrow evaluation was unremarkable for any MRD (PML-RAR?).  Consolidation Cycles 3 and 4 were unremarkable and therapy was completed 8/17/16.  Emy was seen in follow-up for the first 5 months off therapy by Dr. Joseph in [place].  During  this period of time she was weaned from her Diamox and there have been no further complications of her therapy.    Her off therapy care was then transferred to [facility].       Emy was seen in clinic 11/17/2023 at which time there were no significant clinical changes or complaints.  Today she presents for annual follow-up.  She reports that she is in good health without any interval illness or health concerns.  She does report an significant worsening in her frequency of panic attacks. She does report that she is being seen by both a counselor and psychiatrist and there have been adjustments to her medications to address this.  She is still using medical marijuana to help alleviate some of her anxiety and she reports that it works well.  She has headaches occasionally, but nothing of concern to her.  Aches and pains are stable.  She denies any changes in vision and continues to see ophthalmology regularly.  No complaints of any abdominal discomfort or pain.  Still with chronic constipation, but no nausea or vomiting.  No new skin changes or rashes.  No easy bruising or bleeding. Not currently on birth control.  Menstruation unremarkable.  No other concerns or complaints at this time.      Review of Systems:     Constitutional: Afebrile.  Without recent illness.  Energy and activity are good.   HENT: Negative for ear pain, nasal congestion or rhinorrhea, nosebleeds and sore throat.  No mouth sores.  Eyes: Negative for visual changes.  Respiratory: Negative for shortness of breat.  Cardiovascular: Negative.  Gastrointestinal: Negative for nausea, vomiting, abdominal pain, diarrhea. Chronic constipation.  Genitourinary: Negative.  Musculoskeletal: Negative for joint or muscle pains.    Skin: Negative for rash, signs of infection.  Neurological: Negative for numbness, tingling, sensory changes, weakness or headaches.    Endo/Heme/Allergies: Does not bruise/bleed easily.    Psychiatric/Behavioral:  "Increased panic attacks.    PAST MEDICAL HISTORY:     Past Medical History:     1) Previously health, only one episode of otitis media as a child  2) Acute Promyelocytic Leukemia (APML) diagnosis  3) No hospitalizations or surgeries prior to diagnosis of APML  4) Seasonal allergies  5) Obesity (RESOLVED)  6) Ovarian Cyst  7) Disordered Eating  8) Panic attacks  9) Depression/Anxiety  10) Hyperhidrosis  11) Thinning hair       Past Surgical History:      1) Therapy related bone marrow aspiration, lumbar punctures  2) Port-a-cath placement and removal     Birth/Developmental History:     1st of 2 children  No complications of pregnancy, good prenatal care  Delivered at 39 weeks EGA, no complications of delivery, however transferred to the NICU for temperature of 103F - stayed for 10 days  No further complications  Never any concerns for growth and development  Has always met milestones      Menstrual History:  No current birth control.  Normal menses     Allergies:         Allergies as of 08/15/2018    (No Known Allergies)      Social History:   Lives with fiancé.  Attending school.     Family History:      1) Maternal grandfather with Stage IV gastric Ca.  2) Paternal grandfather with \"pre-kidney cancer\"  3) Cousin with lupus  4) Family history of HTN  5) No childhood cancers, no childhood unexpected deaths or illness, no rheumatologicc disease, bleeding or clotting disorders.     Immunizations:  Up to jackie     Medications:   Current Outpatient Medications on File Prior to Encounter   Medication Sig Dispense Refill    sertraline (ZOLOFT) 50 MG Tab Take 50 mg by mouth every day.      ALPRAZolam (XANAX) 0.5 MG Tab Take 0.5 mg by mouth at bedtime as needed for Sleep.      traZODone (DESYREL) 50 MG Tab Take 50 mg by mouth every evening.      Glycopyrrolate 1.5 MG Tab Take 1 Tablet by mouth 2 times a day.      escitalopram (LEXAPRO) 10 MG Tab Take 20 mg by mouth every day.      ketoconazole (NIZORAL) 2 % shampoo       " "LORazepam (ATIVAN) 0.5 MG Tab  (Patient not taking: Reported on 12/12/2024)      spironolactone (ALDACTONE) 25 MG Tab  (Patient not taking: Reported on 12/12/2024)       No current facility-administered medications on file prior to encounter.       OBJECTIVE:     Vitals:   /72 (BP Location: Right arm, Patient Position: Sitting, BP Cuff Size: Adult)   Pulse 85   Temp 37.2 °C (99 °F) (Temporal)   Ht 1.69 m (5' 6.54\")   Wt 84.9 kg (187 lb 2.7 oz)   SpO2 94%     Labs:    Hospital Outpatient Visit on 12/12/2024   Component Date Value    WBC 12/12/2024 9.3     RBC 12/12/2024 4.66     Hemoglobin 12/12/2024 15.4     Hematocrit 12/12/2024 44.0     MCV 12/12/2024 94.4     MCH 12/12/2024 33.0     MCHC 12/12/2024 35.0     RDW 12/12/2024 38.6     Platelet Count 12/12/2024 274     MPV 12/12/2024 9.4     Neutrophils-Polys 12/12/2024 59.90     Lymphocytes 12/12/2024 29.80     Monocytes 12/12/2024 6.50     Eosinophils 12/12/2024 2.90     Basophils 12/12/2024 0.50     Immature Granulocytes 12/12/2024 0.40     Nucleated RBC 12/12/2024 0.00     Neutrophils (Absolute) 12/12/2024 5.57     Lymphs (Absolute) 12/12/2024 2.78     Monos (Absolute) 12/12/2024 0.61     Eos (Absolute) 12/12/2024 0.27     Baso (Absolute) 12/12/2024 0.05     Immature Granulocytes (a* 12/12/2024 0.04     NRBC (Absolute) 12/12/2024 0.00     Sodium 12/12/2024 136     Potassium 12/12/2024 4.6     Chloride 12/12/2024 106     Co2 12/12/2024 19 (L)     Anion Gap 12/12/2024 11.0     Glucose 12/12/2024 89     Bun 12/12/2024 9     Creatinine 12/12/2024 0.81     Calcium 12/12/2024 9.5     Correct Calcium 12/12/2024 9.1     AST(SGOT) 12/12/2024 19     ALT(SGPT) 12/12/2024 10     Alkaline Phosphatase 12/12/2024 68     Total Bilirubin 12/12/2024 0.4     Albumin 12/12/2024 4.5     Total Protein 12/12/2024 7.4     Globulin 12/12/2024 2.9     A-G Ratio 12/12/2024 1.6     25-Hydroxy   Vitamin D 25 12/12/2024 24 (L)     TSH 12/12/2024 1.990     Free T-4 12/12/2024 1.18  "    Magnesium 12/12/2024 2.0     Phosphorus 12/12/2024 3.6     GFR (CKD-EPI) 12/12/2024 103      Physical Exam:    Constitutional: Well-developed, well-nourished, and in no distress.  Well appearing.  HENT: Normocephalic and atraumatic. No nasal congestion or rhinorrhea. Oropharynx is clear and moist. No oral ulcerations or sores.    Eyes: Conjunctivae are normal. Pupils are equal, round.  EOMI.  Non icteric.  Neck: Normal range of motion of neck, no adenopathy.    Cardiovascular: Normal rate, regular rhythm and normal heart sounds.  No murmur heard. DP/radial pulses 2+, cap refill < 2 sec.  Pulmonary/Chest: Effort normal and breath sounds normal. No respiratory distress. Symmetric expansion.  No crackles or wheezes.  Abdomen: Soft. Bowel sounds are normal. No distension and no mass. There is no hepatosplenomegaly.    Genitourinary:  Deferred.  Musculoskeletal: Normal range of motion of lower and upper extremities bilaterally.  Neurological: Alert and oriented to person and place. Exhibits normal muscle tone bilaterally in upper and lower extremities. Gait normal. Coordination normal.    Skin: Skin is warm, dry and pink.  No rash or evidence of skin infection.  No pallor.   Psychiatric: Mood and affect normal for age.    ASSESSMENT AND PLAN:     Emy Francis is a 24 year old previously healthy female with a history of APML, treated on [id number] for 99 months (8 years ) off therapy follow-up.     1) Acute Promyelocytic Leukemia, History:              - Completed therapy ON STUDY [id number], now 99 months off therapy              - CBC with differential demonstrating WBC 9.3, Hgb 15.4, platelets 274              - No physical evidence of disease              - Will RTC 12 months - continue data submission for on study [id number](OS)     2) Panic Attacks:              - Increased in frequency              - With recognition and treatment (medical marijuana) previously improved, now not as effective   - Working with  psychiatrist and counselor     3) History of Hypertension:              - Blood pressure normal today at 128/72 mm Hg.       4) History of Vitamin D Deficiency:              - Vitamin D level 25   - Discussed bone health today in clinic to include supplementation with vitamin D and calcium     7) Survivorship/Late Effects Monitoring:              - Cognitive: No recent neurocognitive testing.  Good memory, no cognitive concerns at this visit.              - Psychosocial:  No reports of behavioral issues.              - Renal:  Blood pressure improved.  Creatinine and electrolytes ordered and to be obtained.              - Cardiac:  ECHO yearly to 10 years.  ECHO 4/2024 with stable EF of 55%.  Will obtain annual ECHO in spring.              - Pulmonary:  No chest/mediastinal radiation.  Clinical exam unremarkable.              - Musckuloskeletal:  High dose steroid exposure during therapy.  History of low vitamin D levels, today 25.  As above discussed supplementation again.              - Growth and Development: No longer obtaining weights with every visit.              - Reproductive:  No longer on birth control.  Will obtain antiMeullerian hormone today to evaluate for premature ovarian failure.              - Performance:  Karnofsky 100 / ECOG 1     Follow up for COG [id number]:       End of Therapy Follow-up Relapse   Physical Exam with VS X Monthly to 12 months  Q3 months to 36 months  Q6 months to 48 months  Yearly to 10 years X   Ht, Wt, BSA                          X   Performance Status X   X   CBC, Diff, Plts X Monthly to 12 months  Q3 months to 36 months  Q6 months to 48 months  Yearly to 10 years X   Electrolytes (Ca++, Mg++, K+) and Creatinine     X   Uric Acid     X   ECHO   X Yearly to 10 years X   BMA X   X         Disposition:  Return to clinic in 12 months for 111 month off therapy evaluation, PE, and labs     Heriberto Hylton MD  Pediatric Hematology / Oncology  [facility]  Cell.   [phone]  [facility]. [phone]

## 2024-12-15 LAB — MIS SERPL-MCNC: 2.17 NG/ML (ref 0.4–16.02)

## 2025-01-31 ENCOUNTER — APPOINTMENT (OUTPATIENT)
Dept: URBAN - METROPOLITAN AREA CLINIC 22 | Facility: CLINIC | Age: 25
Setting detail: DERMATOLOGY
End: 2025-01-31

## 2025-01-31 DIAGNOSIS — L65.9 NONSCARRING HAIR LOSS, UNSPECIFIED: ICD-10-CM | Status: STABLE

## 2025-01-31 DIAGNOSIS — L74.51 PRIMARY FOCAL HYPERHIDROSIS: ICD-10-CM | Status: STABLE

## 2025-01-31 DIAGNOSIS — L70.0 ACNE VULGARIS: ICD-10-CM

## 2025-01-31 PROBLEM — L74.512 PRIMARY FOCAL HYPERHIDROSIS, PALMS: Status: ACTIVE | Noted: 2025-01-31

## 2025-01-31 PROCEDURE — ? COUNSELING

## 2025-01-31 PROCEDURE — ? TREATMENT REGIMEN

## 2025-01-31 PROCEDURE — ? PRESCRIPTION

## 2025-01-31 PROCEDURE — 99214 OFFICE O/P EST MOD 30 MIN: CPT

## 2025-01-31 RX ORDER — GLYCOPYRROLATE 1 MG/1
TABLET ORAL BID
Qty: 90 | Refills: 5 | Status: ERX

## 2025-01-31 RX ORDER — CLINDAMYCIN PHOSPHATE 10 MG/ML
1 LOTION TOPICAL BID
Qty: 60 | Refills: 5 | Status: ERX | COMMUNITY
Start: 2025-01-31

## 2025-01-31 RX ADMIN — CLINDAMYCIN PHOSPHATE 1: 10 LOTION TOPICAL at 00:00

## 2025-01-31 ASSESSMENT — LOCATION DETAILED DESCRIPTION DERM
LOCATION DETAILED: RIGHT INFERIOR CENTRAL MALAR CHEEK
LOCATION DETAILED: LEFT ULNAR PALM
LOCATION DETAILED: RIGHT ULNAR PALM
LOCATION DETAILED: LEFT SUPERIOR PARIETAL SCALP
LOCATION DETAILED: LEFT INFERIOR CENTRAL MALAR CHEEK
LOCATION DETAILED: SUPERIOR MID FOREHEAD

## 2025-01-31 ASSESSMENT — LOCATION ZONE DERM
LOCATION ZONE: HAND
LOCATION ZONE: FACE
LOCATION ZONE: SCALP

## 2025-01-31 ASSESSMENT — LOCATION SIMPLE DESCRIPTION DERM
LOCATION SIMPLE: RIGHT CHEEK
LOCATION SIMPLE: SUPERIOR FOREHEAD
LOCATION SIMPLE: LEFT HAND
LOCATION SIMPLE: LEFT CHEEK
LOCATION SIMPLE: SCALP
LOCATION SIMPLE: RIGHT HAND

## 2025-01-31 NOTE — PROCEDURE: TREATMENT REGIMEN
Continue Regimen: glycopyrrolate 2 mg in morning and 1 mg in evening
Initiate Treatment: Iontophoresis twice weekly
Detail Level: Simple
Continue Regimen: OTC Rogaine daily\\n\\nKetoconazole shampoo 3x a week\\n\\nHair max light device 2-3 x week
Initiate Regimen: OTC benzoyl peroxide wash daily\\nClindamycin bid for 2 weeks with flares
Hide Vanicream Products: No
Action 2: Continue

## 2025-02-16 ENCOUNTER — OFFICE VISIT (OUTPATIENT)
Dept: URGENT CARE | Facility: PHYSICIAN GROUP | Age: 25
End: 2025-02-16
Payer: COMMERCIAL

## 2025-02-16 VITALS
HEART RATE: 88 BPM | HEIGHT: 67 IN | BODY MASS INDEX: 29.66 KG/M2 | DIASTOLIC BLOOD PRESSURE: 64 MMHG | RESPIRATION RATE: 20 BRPM | SYSTOLIC BLOOD PRESSURE: 112 MMHG | OXYGEN SATURATION: 100 % | TEMPERATURE: 98 F | WEIGHT: 189 LBS

## 2025-02-16 DIAGNOSIS — J40 BRONCHITIS: ICD-10-CM

## 2025-02-16 PROCEDURE — 99213 OFFICE O/P EST LOW 20 MIN: CPT | Performed by: FAMILY MEDICINE

## 2025-02-16 RX ORDER — METHYLPREDNISOLONE 4 MG/1
TABLET ORAL
Qty: 21 TABLET | Refills: 0 | Status: SHIPPED | OUTPATIENT
Start: 2025-02-16

## 2025-02-16 RX ORDER — BENZONATATE 200 MG/1
200 CAPSULE ORAL 3 TIMES DAILY PRN
Qty: 45 CAPSULE | Refills: 0 | Status: SHIPPED | OUTPATIENT
Start: 2025-02-16

## 2025-02-16 ASSESSMENT — FIBROSIS 4 INDEX: FIB4 SCORE: 0.55

## 2025-02-16 NOTE — PROGRESS NOTES
CC:  cough        Cough  This is a new problem. The current episode started 2 wks  ago. The problem has been unchanged. The problem occurs constantly. The cough is dry. Associated symptoms include : wheezing.     denies  fever     . Pertinent negatives include no   headaches, nausea, vomiting, diarrhea, sweats, weight loss . Nothing aggravates the symptoms.  Patient has tried nothing for the symptoms. There is no history of asthma.        Past Medical History:   Diagnosis Date    Cancer ([facility]) 2015    leukemia    AML (acute myeloblastic leukemia) ([facility])          Social History     Tobacco Use    Smoking status: Never    Smokeless tobacco: Never   Vaping Use    Vaping status: Never Used   Substance Use Topics    Alcohol use: Yes     Comment: occ     Drug use: Never         Current Outpatient Medications on File Prior to Visit   Medication Sig Dispense Refill    sertraline (ZOLOFT) 50 MG Tab Take 50 mg by mouth every day.      ALPRAZolam (XANAX) 0.5 MG Tab Take 0.5 mg by mouth at bedtime as needed for Sleep.      traZODone (DESYREL) 50 MG Tab Take 50 mg by mouth every evening.      Glycopyrrolate 1.5 MG Tab Take 1 Tablet by mouth 2 times a day.      escitalopram (LEXAPRO) 10 MG Tab Take 20 mg by mouth every day.      ketoconazole (NIZORAL) 2 % shampoo       LORazepam (ATIVAN) 0.5 MG Tab       spironolactone (ALDACTONE) 25 MG Tab        No current facility-administered medications on file prior to visit.                    Review of Systems   Constitutional: Negative for fever and weight loss.   HENT: negative for otalgia  Cardiovascular - denies chest pain or dyspnea  Respiratory: Positive for cough.  .  + for wheezing.    Neurological: Negative for headaches.   GI - denies nausea, vomiting or diarrhea  Neuro - denies numbness or tingling.            Objective:     /64 (BP Location: Right arm, Patient Position: Sitting, BP Cuff Size: Adult)   Pulse 88   Temp 36.7 °C (98 °F) (Temporal)   Resp 20   Ht 1.702 m  "(5' 7\")   Wt 85.7 kg (189 lb)   SpO2 100%     Physical Exam   Constitutional: patient is oriented to person, place, and time. Patient appears well-developed and well-nourished. No distress.   HENT:   Head: Normocephalic and atraumatic.   Right Ear: External ear normal.   Left Ear: External ear normal.   Nose: Mucosal edema  present. Right sinus exhibits no maxillary sinus tenderness. Left sinus exhibits no maxillary sinus tenderness.   Mouth/Throat: Mucous membranes are normal. No oral lesions.  No posterior pharyngeal erythema.  No oropharyngeal exudate or posterior oropharyngeal edema.   Eyes: Conjunctivae and EOM are normal. Pupils are equal, round, and reactive to light. Right eye exhibits no discharge. Left eye exhibits no discharge. No scleral icterus.   Neck: Normal range of motion. Neck supple. No tracheal deviation present.   Cardiovascular: Normal rate, regular rhythm and normal heart sounds.  Exam reveals no friction rub.    Pulmonary/Chest: Effort normal. No respiratory distress. Patient has no wheezes or rhonchi. Patient has no rales.    Musculoskeletal:  exhibits no edema.   Lymphadenopathy:     Patient has no cervical adenopathy.      Neurological: patient is alert and oriented to person, place, and time.   Skin: Skin is warm and dry. No rash noted. No erythema.   Psychiatric: patient  has a normal mood and affect.  behavior is normal.   Nursing note and vitals reviewed.              Assessment/Plan:       1. Bronchitis     - benzonatate (TESSALON) 200 MG capsule; Take 1 Capsule by mouth 3 times a day as needed for Cough.  Dispense: 45 Capsule; Refill: 0  - methylPREDNISolone (MEDROL DOSEPAK) 4 MG Tablet Therapy Pack; Follow schedule on package instructions.  Dispense: 21 Tablet; Refill: 0      Differential diagnosis, natural history, supportive care, and indications for immediate follow-up discussed. All questions answered. Patient agrees with the plan of care.     Follow-up as needed if symptoms " worsen or fail to improve to PCP, Urgent care or Emergency Room.     I have personally reviewed prior external notes and test results pertinent to today's visit.  I have independently reviewed and interpreted all diagnostics ordered during this urgent care acute visit.

## 2025-08-25 ENCOUNTER — HOSPITAL ENCOUNTER (OUTPATIENT)
Dept: LAB | Facility: MEDICAL CENTER | Age: 25
End: 2025-08-25
Attending: PEDIATRICS
Payer: COMMERCIAL

## 2025-08-25 DIAGNOSIS — Z08 ROUTINE CANCER FOLLOW-UP VISIT: ICD-10-CM

## 2025-08-25 LAB
BASOPHILS # BLD AUTO: 0.2 % (ref 0–1.8)
BASOPHILS # BLD: 0.01 K/UL (ref 0–0.12)
EOSINOPHIL # BLD AUTO: 0.11 K/UL (ref 0–0.51)
EOSINOPHIL NFR BLD: 1.8 % (ref 0–6.9)
ERYTHROCYTE [DISTWIDTH] IN BLOOD BY AUTOMATED COUNT: 39.7 FL (ref 35.9–50)
HCT VFR BLD AUTO: 42.5 % (ref 37–47)
HGB BLD-MCNC: 14.6 G/DL (ref 12–16)
IMM GRANULOCYTES # BLD AUTO: 0.02 K/UL (ref 0–0.11)
IMM GRANULOCYTES NFR BLD AUTO: 0.3 % (ref 0–0.9)
LYMPHOCYTES # BLD AUTO: 0.74 K/UL (ref 1–4.8)
LYMPHOCYTES NFR BLD: 12.4 % (ref 22–41)
MCH RBC QN AUTO: 32.2 PG (ref 27–33)
MCHC RBC AUTO-ENTMCNC: 34.4 G/DL (ref 32.2–35.5)
MCV RBC AUTO: 93.8 FL (ref 81.4–97.8)
MONOCYTES # BLD AUTO: 0.27 K/UL (ref 0–0.85)
MONOCYTES NFR BLD AUTO: 4.5 % (ref 0–13.4)
NEUTROPHILS # BLD AUTO: 4.82 K/UL (ref 1.82–7.42)
NEUTROPHILS NFR BLD: 80.8 % (ref 44–72)
NRBC # BLD AUTO: 0 K/UL
NRBC BLD-RTO: 0 /100 WBC (ref 0–0.2)
PLATELET # BLD AUTO: 252 K/UL (ref 164–446)
PMV BLD AUTO: 9.5 FL (ref 9–12.9)
RBC # BLD AUTO: 4.53 M/UL (ref 4.2–5.4)
WBC # BLD AUTO: 6 K/UL (ref 4.8–10.8)

## 2025-08-25 PROCEDURE — 36415 COLL VENOUS BLD VENIPUNCTURE: CPT

## 2025-08-25 PROCEDURE — 85025 COMPLETE CBC W/AUTO DIFF WBC: CPT

## (undated) DEVICE — KIT ANESTHESIA W/CIRCUIT & 3/LT BAG W/FILTER (20EA/CA)

## (undated) DEVICE — MASK ANESTHESIA ADULT  - (100/CA)

## (undated) DEVICE — SUTURE 2-0 VICRYL PLUS CT-2 - 27 INCH (36/BX)

## (undated) DEVICE — ELECTRODE 850 FOAM ADHESIVE - HYDROGEL RADIOTRNSPRNT (50/PK)

## (undated) DEVICE — SENSOR SPO2 NEO LNCS ADHESIVE (20/BX) SEE USER NOTES

## (undated) DEVICE — SLEEVE, VASO, THIGH, MED

## (undated) DEVICE — ARMREST CRADLE FOAM - (2PR/PK 12PR/CA)

## (undated) DEVICE — SUCTION INSTRUMENT YANKAUER BULBOUS TIP W/O VENT (50EA/CA)

## (undated) DEVICE — TRAY SRGPRP PVP IOD WT PRP - (20/CA)

## (undated) DEVICE — CANISTER SUCTION 3000ML MECHANICAL FILTER AUTO SHUTOFF MEDI-VAC NONSTERILE LF DISP  (40EA/CA)

## (undated) DEVICE — BANDAID X-LARGE 2 X 4 IN LF (50EA/BX)

## (undated) DEVICE — SUTURE 4-0 VICRYL PLUSFS-1 - 27 INCH (36/BX)

## (undated) DEVICE — SODIUM CHL IRRIGATION 0.9% 1000ML (12EA/CA)

## (undated) DEVICE — PAD SANITARY 11IN MAXI IND WRAPPED  (12EA/PK 24PK/CA)

## (undated) DEVICE — KIT  I.V. START (100EA/CA)

## (undated) DEVICE — GOWN WARMING STANDARD FLEX - (30/CA)

## (undated) DEVICE — NEPTUNE 4 PORT MANIFOLD - (20/PK)

## (undated) DEVICE — TROCAR5X55 KII SHIELDED SYS - (6/BX)

## (undated) DEVICE — GOWN SURGEONS X-LARGE - DISP. (30/CA)

## (undated) DEVICE — GLOVE SZ 7 BIOGEL PI MICRO - PF LF (50PR/BX 4BX/CA)

## (undated) DEVICE — BAG RETRIEVAL 10ML (10EA/BX)

## (undated) DEVICE — SUTURE GENERAL

## (undated) DEVICE — CANISTER SUCTION RIGID RED 1500CC (40EA/CA)

## (undated) DEVICE — TUBE E-T HI-LO CUFF 6.5MM (10EA/BX)

## (undated) DEVICE — CHLORAPREP 26 ML APPLICATOR - ORANGE TINT(25/CA)

## (undated) DEVICE — GLOVE BIOGEL SZ 7.5 SURGICAL PF LTX - (50PR/BX 4BX/CA)

## (undated) DEVICE — PACK LAPAROSCOPY - (1/CA)

## (undated) DEVICE — SET EXTENSION WITH 2 PORTS (48EA/CA) ***PART #2C8610 IS A SUBSTITUTE*****

## (undated) DEVICE — BANDAID SHEER STRIP 3/4 IN (100EA/BX 12BX/CA)

## (undated) DEVICE — TUBE CONNECTING SUCTION - CLEAR PLASTIC STERILE 72 IN (50EA/CA)

## (undated) DEVICE — PROTECTOR ULNA NERVE - (36PR/CA)

## (undated) DEVICE — CANNULA W/SEAL11X100ZTHREAD - (12/BX)

## (undated) DEVICE — NEEDLE INSFL 120MM 14GA VRRS - (20/BX)

## (undated) DEVICE — TROCAR Z THREAD 11 X 100 - BLADED (6/BX)

## (undated) DEVICE — LACTATED RINGERS INJ 1000 ML - (14EA/CA 60CA/PF)

## (undated) DEVICE — HEAD HOLDER JUNIOR/ADULT

## (undated) DEVICE — TUBING CLEARLINK DUO-VENT - C-FLO (48EA/CA)

## (undated) DEVICE — SET LEADWIRE 5 LEAD BEDSIDE DISPOSABLE ECG (1SET OF 5/EA)

## (undated) DEVICE — SET SUCTION/IRRIGATION WITH DISPOSABLE TIP (6/CA )PART #0250-070-520 IS A SUB

## (undated) DEVICE — CATHETER IV 20 GA X 1-1/4 ---SURG.& SDS ONLY--- (50EA/BX)